# Patient Record
Sex: FEMALE | Race: WHITE
[De-identification: names, ages, dates, MRNs, and addresses within clinical notes are randomized per-mention and may not be internally consistent; named-entity substitution may affect disease eponyms.]

---

## 2016-05-30 VITALS — DIASTOLIC BLOOD PRESSURE: 85 MMHG | SYSTOLIC BLOOD PRESSURE: 193 MMHG

## 2017-05-02 ENCOUNTER — HOSPITAL ENCOUNTER (OUTPATIENT)
Dept: HOSPITAL 24 - LAB | Age: 77
End: 2017-05-02
Attending: NURSE PRACTITIONER
Payer: COMMERCIAL

## 2017-05-02 DIAGNOSIS — R42: ICD-10-CM

## 2017-05-02 DIAGNOSIS — I50.1: Primary | ICD-10-CM

## 2017-05-02 DIAGNOSIS — D63.8: ICD-10-CM

## 2017-05-02 LAB
ALBUMIN SERPL BCP-MCNC: 3.6 G/DL (ref 3.4–5)
ALP SERPL-CCNC: 106 UNITS/L (ref 46–116)
ALT SERPL W P-5'-P-CCNC: 13 UNITS/L (ref 12–78)
AST SERPL W P-5'-P-CCNC: 19 UNITS/L (ref 15–37)
BASOPHILS # BLD AUTO: 0.1 X10^3/UL (ref 0–0.1)
BASOPHILS NFR BLD AUTO: 1.3 % (ref 0.2–1)
BUN SERPL-MCNC: 17 MG/DL (ref 7–18)
CALCIUM ALBUM COR SERPL-SCNC: (no result) MG/DL (ref 8.5–10.1)
CALCIUM ALBUM COR SERPL-SCNC: (no result) MMOL/L (ref 136–145)
CALCIUM SERPL-MCNC: 9.3 MG/DL (ref 8.5–10.1)
CHLORIDE SERPL-SCNC: 107 MMOL/L (ref 98–107)
CO2 SERPL-SCNC: 34.1 MMOL/L (ref 21–32)
CREAT SERPL-MCNC: 1.89 MG/DL (ref 0.55–1.02)
EGFR  BLACK RACES: 33 (ref 60–?)
EOSINOPHIL # BLD AUTO: 0.1 X10^3/UL (ref 0–0.2)
EOSINOPHIL NFR BLD AUTO: 2 % (ref 0.9–2.9)
ERYTHROCYTE [DISTWIDTH] IN BLOOD BY AUTOMATED COUNT: 22.8 % (ref 11.6–16.5)
GLUCOSE BLD-SCNC: 90 MG/DL (ref 65–99)
HCT VFR BLD AUTO: 36.5 % (ref 36–47)
HGB BLD-MCNC: 11.6 G/DL (ref 12–16)
LYMPHOCYTES # BLD AUTO: 2.5 X10^3/UL (ref 1.3–2.9)
LYMPHOCYTES NFR BLD AUTO: 35 % (ref 21–51)
MCH RBC QN AUTO: 23.9 PG (ref 27–34)
MCHC RBC AUTO-ENTMCNC: 31.8 G/DL (ref 33–35)
MCV RBC AUTO: 75.3 FL (ref 80–100)
MONOCYTES # BLD AUTO: 0.6 X10^3/UL (ref 0.3–0.8)
MONOCYTES NFR BLD AUTO: 8.4 % (ref 0–13)
NEUTROPHILS # BLD AUTO: 3.9 X10^3/UL (ref 2.2–4.8)
NEUTROPHILS NFR BLD AUTO: 53.3 % (ref 42–75)
PLAT MORPH BLD: NORMAL
PLATELET # BLD: 243 X10^3/UL (ref 150–450)
PMV BLD AUTO: 10.2 FL (ref 7.4–11)
PROT SERPL-MCNC: 7.1 G/DL (ref 6.4–8.2)
RBC # BLD AUTO: 4.85 X10^6/UL (ref 3.5–5.4)
RBC MORPH BLD: (no result)
RBC MORPH BLD: SLIGHT
SODIUM SERPL-SCNC: 147 MMOL/L (ref 136–145)
WBC NRBC COR # BLD AUTO: 7.2 X10^3/UL (ref 3.6–10)

## 2017-05-02 PROCEDURE — 80053 COMPREHEN METABOLIC PANEL: CPT

## 2017-05-02 PROCEDURE — 83880 ASSAY OF NATRIURETIC PEPTIDE: CPT

## 2017-05-02 PROCEDURE — 85025 COMPLETE CBC W/AUTO DIFF WBC: CPT

## 2017-05-02 PROCEDURE — 36415 COLL VENOUS BLD VENIPUNCTURE: CPT

## 2017-05-03 LAB — BNP SERPL-MCNC: 494 PG/ML (ref 0–79)

## 2017-08-08 ENCOUNTER — HOSPITAL ENCOUNTER (INPATIENT)
Dept: HOSPITAL 24 - MED/SURG | Age: 77
LOS: 4 days | Discharge: HOME | DRG: 392 | End: 2017-08-12
Attending: INTERNAL MEDICINE | Admitting: INTERNAL MEDICINE
Payer: COMMERCIAL

## 2017-08-08 VITALS — BODY MASS INDEX: 28.6 KG/M2

## 2017-08-08 DIAGNOSIS — R11.2: ICD-10-CM

## 2017-08-08 DIAGNOSIS — E86.0: ICD-10-CM

## 2017-08-08 DIAGNOSIS — K44.9: ICD-10-CM

## 2017-08-08 DIAGNOSIS — K21.9: ICD-10-CM

## 2017-08-08 DIAGNOSIS — K20.8: ICD-10-CM

## 2017-08-08 DIAGNOSIS — R19.7: ICD-10-CM

## 2017-08-08 DIAGNOSIS — R53.1: ICD-10-CM

## 2017-08-08 DIAGNOSIS — I10: ICD-10-CM

## 2017-08-08 DIAGNOSIS — F41.8: ICD-10-CM

## 2017-08-08 DIAGNOSIS — K26.9: ICD-10-CM

## 2017-08-08 DIAGNOSIS — K25.9: ICD-10-CM

## 2017-08-08 DIAGNOSIS — R10.84: ICD-10-CM

## 2017-08-08 DIAGNOSIS — K52.89: Primary | ICD-10-CM

## 2017-08-08 DIAGNOSIS — E03.8: ICD-10-CM

## 2017-08-08 DIAGNOSIS — R06.09: ICD-10-CM

## 2017-08-08 DIAGNOSIS — K29.80: ICD-10-CM

## 2017-08-08 DIAGNOSIS — R93.5: ICD-10-CM

## 2017-08-08 DIAGNOSIS — I50.9: ICD-10-CM

## 2017-08-08 LAB
ALBUMIN SERPL BCP-MCNC: 3.1 G/DL (ref 3.4–5)
ALP SERPL-CCNC: 120 UNITS/L (ref 46–116)
ALT SERPL W P-5'-P-CCNC: 276 UNITS/L (ref 12–78)
AMYLASE SERPL-CCNC: 14 UNITS/L (ref 25–115)
AST SERPL W P-5'-P-CCNC: 334 UNITS/L (ref 15–37)
BASOPHILS # BLD AUTO: 0.1 X10^3/UL (ref 0–0.1)
BASOPHILS NFR BLD AUTO: 0.8 % (ref 0.2–1)
BUN SERPL-MCNC: 17 MG/DL (ref 7–18)
CALCIUM ALBUM COR SERPL-SCNC: 143 MMOL/L (ref 136–145)
CALCIUM ALBUM COR SERPL-SCNC: 8.9 MG/DL (ref 8.5–10.1)
CALCIUM SERPL-MCNC: 8.2 MG/DL (ref 8.5–10.1)
CHLORIDE SERPL-SCNC: 108 MMOL/L (ref 98–107)
CO2 SERPL-SCNC: 25.7 MMOL/L (ref 21–32)
CREAT SERPL-MCNC: 1.28 MG/DL (ref 0.55–1.02)
EGFR  BLACK RACES: 52 (ref 60–?)
EOSINOPHIL # BLD AUTO: 0 X10^3/UL (ref 0–0.2)
EOSINOPHIL NFR BLD AUTO: 0.1 % (ref 0.9–2.9)
ERYTHROCYTE [DISTWIDTH] IN BLOOD BY AUTOMATED COUNT: 18.6 % (ref 11.6–16.5)
GLUCOSE BLD-SCNC: 122 MG/DL (ref 65–99)
HCT VFR BLD AUTO: 32.4 % (ref 36–47)
HGB BLD-MCNC: 10 G/DL (ref 12–16)
LIPASE SERPL-CCNC: 50 UNITS/L (ref 73–393)
LYMPHOCYTES # BLD AUTO: 1.4 X10^3/UL (ref 1.3–2.9)
LYMPHOCYTES NFR BLD AUTO: 15.7 % (ref 21–51)
MCH RBC QN AUTO: 23 PG (ref 27–34)
MCHC RBC AUTO-ENTMCNC: 31 G/DL (ref 33–35)
MCV RBC AUTO: 74.3 FL (ref 80–100)
MONOCYTES # BLD AUTO: 0.7 X10^3/UL (ref 0.3–0.8)
MONOCYTES NFR BLD AUTO: 8.7 % (ref 0–13)
NEUTROPHILS # BLD AUTO: 6.5 X10^3/UL (ref 2.2–4.8)
NEUTROPHILS NFR BLD AUTO: 74.7 % (ref 42–75)
PLAT MORPH BLD: NORMAL
PLATELET # BLD: 235 X10^3/UL (ref 150–450)
PMV BLD AUTO: 9.6 FL (ref 7.4–11)
PROT SERPL-MCNC: 6.4 G/DL (ref 6.4–8.2)
RBC # BLD AUTO: 4.36 X10^6/UL (ref 3.5–5.4)
RBC MORPH BLD: (no result)
RBC MORPH BLD: (no result)
SODIUM SERPL-SCNC: 142 MMOL/L (ref 136–145)
WBC NRBC COR # BLD AUTO: 8.7 X10^3/UL (ref 3.6–10)

## 2017-08-08 PROCEDURE — 88342 IMHCHEM/IMCYTCHM 1ST ANTB: CPT

## 2017-08-08 PROCEDURE — 84132 ASSAY OF SERUM POTASSIUM: CPT

## 2017-08-08 PROCEDURE — 99100 ANES PT EXTEME AGE<1 YR&>70: CPT

## 2017-08-08 PROCEDURE — A4217 STERILE WATER/SALINE, 500 ML: HCPCS

## 2017-08-08 PROCEDURE — 87045 FECES CULTURE AEROBIC BACT: CPT

## 2017-08-08 PROCEDURE — 80053 COMPREHEN METABOLIC PANEL: CPT

## 2017-08-08 PROCEDURE — 88305 TISSUE EXAM BY PATHOLOGIST: CPT

## 2017-08-08 PROCEDURE — 82150 ASSAY OF AMYLASE: CPT

## 2017-08-08 PROCEDURE — 86677 HELICOBACTER PYLORI ANTIBODY: CPT

## 2017-08-08 PROCEDURE — 87427 SHIGA-LIKE TOXIN AG IA: CPT

## 2017-08-08 PROCEDURE — 87338 HPYLORI STOOL AG IA: CPT

## 2017-08-08 PROCEDURE — 87899 AGENT NOS ASSAY W/OPTIC: CPT

## 2017-08-08 PROCEDURE — 94760 N-INVAS EAR/PLS OXIMETRY 1: CPT

## 2017-08-08 PROCEDURE — 36415 COLL VENOUS BLD VENIPUNCTURE: CPT

## 2017-08-08 PROCEDURE — 87493 C DIFF AMPLIFIED PROBE: CPT

## 2017-08-08 PROCEDURE — S0030 INJECTION, METRONIDAZOLE: HCPCS

## 2017-08-08 PROCEDURE — 85025 COMPLETE CBC W/AUTO DIFF WBC: CPT

## 2017-08-08 PROCEDURE — 74177 CT ABD & PELVIS W/CONTRAST: CPT

## 2017-08-08 PROCEDURE — 71010: CPT

## 2017-08-08 PROCEDURE — S0106 BUPROPION HCL SR 60 TABLETS: HCPCS

## 2017-08-08 PROCEDURE — 93005 ELECTROCARDIOGRAM TRACING: CPT

## 2017-08-08 PROCEDURE — 87205 SMEAR GRAM STAIN: CPT

## 2017-08-08 PROCEDURE — 93010 ELECTROCARDIOGRAM REPORT: CPT

## 2017-08-08 PROCEDURE — 83690 ASSAY OF LIPASE: CPT

## 2017-08-08 PROCEDURE — 82270 OCCULT BLOOD FECES: CPT

## 2017-08-08 RX ADMIN — METRONIDAZOLE SCH MLS/HR: 5 INJECTION, SOLUTION INTRAVENOUS at 15:22

## 2017-08-08 RX ADMIN — METRONIDAZOLE SCH MLS/HR: 5 INJECTION, SOLUTION INTRAVENOUS at 21:52

## 2017-08-08 NOTE — RAD
AP Chest



Indication: Weakness



Comparison: 6/9/16



Findings:



The trachea is midline.  The cardiac silhouette is enlarged. Left chest wall AICD projects in expect
ed positioning. The lungs are clear without focal infiltrate or effusion.  The bony thorax is unrema
rkable.  



IMPRESSION: 

 

1.  Cardiomegaly without acute airspace disease or CHF.



Reported By:Electronically Signed by PARISH BRUSH MD at 8/8/2017 1:58:44 PM

## 2017-08-08 NOTE — DR.H&P
H&P





- History & Physical for Day of:


H&P Date: 08/08/17





- Chief Complaint


Chief Complaint: abdominal pain,  loose stool with mucous, pallor weakness and 

naik





- Allergies


Allergies/Adverse Reactions: 


 Allergies











Allergy/AdvReac Type Severity Reaction Status Date / Time


 


No Known Drug Allergies Allergy   Verified 08/08/17 14:14














- History of Present Illness


History of Present Illness: 77 WF DIRECT ADMIT FROM DR BERGERON OFFICE WITH CO 

PALLOR  AND ABDOMINAL PAIN AND NAIK. PT HAD LABS CBC CMP YESTERDAY. PT STARTED 

ON PO CIPRO FOR COLITIS. PT MUCH WEAKER TODAY. PLAN TO ADMIT FOR IV ATBX, CT 

ABD PELVIS, ADMISSION LABS, ANEMIA PANEL.  IV HYDRATIN AND REPEAT AM LABS





- Past Medical History


Past Medical History: Anemia, Angina, Anxiety, Arthritis, CHF, Dementia, 

Depression, GERD, Hypertension, Hyperthyroidism, Hypothyroidism


Additional Medical History: Hiatal Hernia, Urinary Tract Infections, Urinary 

Incontinence, Fibromyalgia





- Past Surgical History


Surgical History: Cholecystectomy, Hysterectomy, Ortho Surgery, Other


Additional Surgical History: ICD/Pacemaker, Cancer removed from Back





- Family History


Family Medical History: Cancer





- Social History


Does patient currently use any type of tobacco product: No


Have you used tobacco products in the last 12 months: No


Type of Tobacco Use: None


Does any household member use tobacco: No


Alcohol Use: None


Drug Use: Prescription Drugs





- Review of Systems


Constitutional: No Symptoms Reported


Eyes: No Symptoms Reported


ENT: No Symptoms Reported


Respiratory: SOB with Excertion


Cardiovascular: No Symptoms Reported


Gastrointestinal: Nausea, Vomiting, Abdominal Pain, Diarrhea


Genitourinary: No Symptoms Reported


Musculoskeletal: Back Pain, Leg Pain


Skin: No Symptoms Reported


Neurological: Weakness





- Physical Exam


Vital Signs: 


 





Temperature                      97.4 F


Pulse Rate [Left Brachial]       73


Respiratory Rate                 18


Blood Pressure [Right Arm]       182/76


Blood Pressure [Left Arm]        199/94


Blood Pressure                   193/85


O2 Sat by Pulse Oximetry         100








Oriented: Normal


Eyes: Normal


Ear: Normal


Nose: Normal


Throat: Dry


Respiratory: Clear Throughout


Cardiovascular: Normal


: Normal


Auscultation: Bowel Sounds: Normal


Palpation: Normal


Tenderness: RUQ, RLQ, Epigastric, Suprapubic


Skin: Decreased Turgur, Other (PALLOR)


Musculoskeletal: Back:Lumbar


Psychiatric: Anxiety


Affect: Anxious


Speech Pattern: Clear, Appropriate





- Assessment/Plan


(1) Abdominal pain


Qualifiers: 


   Abdominal location: A 


Status: Acute   


Plan: ADMIT, CT ABD PELVIS.  NPO, IV HYDRATION IV CIPRO.  ADMISSION LABS CBC 

CMP UA, AMYLASE LIPASE.  PAIN AND NAUSEA CONTROL.  STOOL STUDIES AND ANEMIA 

PANEL   





(2) Nausea vomiting and diarrhea


Status: Acute   





(3) CHF (congestive heart failure)


Qualifiers: 


   Congestive heart failure type: C   Congestive heart failure chronicity: C 


Status: Chronic   


Plan: CXR ON ADMISSION   





(4) GERD (gastroesophageal reflux disease)


Qualifiers: 


   Esophagitis presence: E 


Status: Chronic   


Plan: PPI THERAPY   





(5) Hypertension


Qualifiers: 


   Hypertension type: H 


Status: Chronic   





(6) Hypothyroidism


Qualifiers: 


   Hypothyroidism type: H 


Status: Chronic

## 2017-08-09 LAB
ALBUMIN SERPL BCP-MCNC: 2.8 G/DL (ref 3.4–5)
ALP SERPL-CCNC: 115 UNITS/L (ref 46–116)
ALT SERPL W P-5'-P-CCNC: 277 UNITS/L (ref 12–78)
AST SERPL W P-5'-P-CCNC: 322 UNITS/L (ref 15–37)
BASOPHILS # BLD AUTO: 0.1 X10^3/UL (ref 0–0.1)
BASOPHILS NFR BLD AUTO: 1 % (ref 0.2–1)
BUN SERPL-MCNC: 17 MG/DL (ref 7–18)
CALCIUM ALBUM COR SERPL-SCNC: (no result) MMOL/L (ref 136–145)
CALCIUM ALBUM COR SERPL-SCNC: 8.9 MG/DL (ref 8.5–10.1)
CALCIUM SERPL-MCNC: 7.9 MG/DL (ref 8.5–10.1)
CHLORIDE SERPL-SCNC: 108 MMOL/L (ref 98–107)
CO2 SERPL-SCNC: 24.1 MMOL/L (ref 21–32)
CREAT SERPL-MCNC: 1.28 MG/DL (ref 0.55–1.02)
EGFR  BLACK RACES: 52 (ref 60–?)
EOSINOPHIL # BLD AUTO: 0 X10^3/UL (ref 0–0.2)
EOSINOPHIL NFR BLD AUTO: 0.2 % (ref 0.9–2.9)
ERYTHROCYTE [DISTWIDTH] IN BLOOD BY AUTOMATED COUNT: 18.4 % (ref 11.6–16.5)
GLUCOSE BLD-SCNC: 110 MG/DL (ref 65–99)
HCT VFR BLD AUTO: 31 % (ref 36–47)
HGB BLD-MCNC: 9.7 G/DL (ref 12–16)
LYMPHOCYTES # BLD AUTO: 1.8 X10^3/UL (ref 1.3–2.9)
LYMPHOCYTES NFR BLD AUTO: 20.4 % (ref 21–51)
MCH RBC QN AUTO: 23.2 PG (ref 27–34)
MCHC RBC AUTO-ENTMCNC: 31.3 G/DL (ref 33–35)
MCV RBC AUTO: 74.1 FL (ref 80–100)
MONOCYTES # BLD AUTO: 1 X10^3/UL (ref 0.3–0.8)
MONOCYTES NFR BLD AUTO: 12 % (ref 0–13)
NEUTROPHILS # BLD AUTO: 5.7 X10^3/UL (ref 2.2–4.8)
NEUTROPHILS NFR BLD AUTO: 66.4 % (ref 42–75)
PLAT MORPH BLD: NORMAL
PLATELET # BLD: 211 X10^3/UL (ref 150–450)
PMV BLD AUTO: 10.4 FL (ref 7.4–11)
PROT SERPL-MCNC: 5.9 G/DL (ref 6.4–8.2)
RBC # BLD AUTO: 4.19 X10^6/UL (ref 3.5–5.4)
RBC MORPH BLD: (no result)
SODIUM SERPL-SCNC: 144 MMOL/L (ref 136–145)
WBC NRBC COR # BLD AUTO: 8.6 X10^3/UL (ref 3.6–10)

## 2017-08-09 RX ADMIN — METRONIDAZOLE SCH MLS/HR: 5 INJECTION, SOLUTION INTRAVENOUS at 15:34

## 2017-08-09 RX ADMIN — CIPROFLOXACIN SCH MLS/HR: 2 INJECTION, SOLUTION INTRAVENOUS at 21:08

## 2017-08-09 RX ADMIN — METRONIDAZOLE SCH MLS/HR: 5 INJECTION, SOLUTION INTRAVENOUS at 21:08

## 2017-08-09 RX ADMIN — LISINOPRIL SCH MG: 20 TABLET ORAL at 15:00

## 2017-08-09 RX ADMIN — METRONIDAZOLE SCH MLS/HR: 5 INJECTION, SOLUTION INTRAVENOUS at 09:37

## 2017-08-09 RX ADMIN — METRONIDAZOLE SCH MLS/HR: 5 INJECTION, SOLUTION INTRAVENOUS at 04:30

## 2017-08-09 RX ADMIN — CLONAZEPAM SCH MG: 0.5 TABLET ORAL at 21:09

## 2017-08-09 RX ADMIN — LISINOPRIL SCH MG: 20 TABLET ORAL at 21:10

## 2017-08-09 NOTE — PCM.PROG
Progress Note





- Progress Note for Day of


Date: 08/09/17





- Subjective


Subjective: abdominal pain, nausea improving.  pt npo this am for ct scan abd/

pelvis





- Past Medical Family Social History


Past Med/Fam/Surg Hx: No changes since H&P


Allergies: 


Allergies





No Known Drug Allergies Allergy (Verified 08/08/17 14:14)


 











- Review of Systems


ROS: No change since H&P





- Vital Signs and I&O's


Vital Signs: 


 





Temperature                      97.4 F


Pulse Rate [Left Brachial]       73


Respiratory Rate                 20


Blood Pressure [Right Arm]       195/84


Blood Pressure [Left Arm]        173/82


Blood Pressure                   193/85


O2 Sat by Pulse Oximetry         97








Intake and Output: 


 Intake & Output











 08/07/17 08/08/17 08/09/17 08/10/17





 11:59 11:59 11:59 11:59


 


Intake Total   340 590


 


Balance   340 590














- Physical Exam


Oriented: Normal


Eyes: Normal


Ear: Normal


Nose: Normal


Throat: Dry


Respiratory: Diminished


Cardiovascular: Normal


: Normal


Auscultation: Bowel Sounds: Normal


Tenderness: RUQ, RLQ, Epigastric, Suprapubic


Skin: Decreased Turgur, Other (PALLOR)


Musculoskeletal: Back:Lumbar


Psychiatric: Anxiety


Affect: Anxious


Speech Pattern: Clear, Appropriate





- Laboratory and Diagnostics


Result Diagrams: 


 08/09/17 03:05





 08/09/17 03:05


Labs: 


 Laboratory











WBC  8.6 X10^3/uL (3.6-10.0)   08/09/17  03:05    


 


RBC  4.19 X10^6/uL (3.5-5.4)   08/09/17  03:05    


 


Hgb  9.7 g/dL (12.0-16.0)  L  08/09/17  03:05    


 


Hct  31.0 % (36.0-47.0)  L  08/09/17  03:05    


 


MCV  74.1 fL (80.0-100.0)  L  08/09/17  03:05    


 


MCH  23.2 pg (27.0-34.0)  L  08/09/17  03:05    


 


MCHC  31.3 g/dL (33.0-35.0)  L  08/09/17  03:05    


 


RDW  18.4 % (11.6-16.5)  H  08/09/17  03:05    


 


Plt Count  211 X10^3/uL (150.0-450.0)   08/09/17  03:05    


 


Plt Count Comment  Adequate  (ADEQUATE)   08/09/17  03:05    


 


MPV  10.4 fL (7.4-11.0)   08/09/17  03:05    


 


Neut %  66.4 % (42.0-75.0)   08/09/17  03:05    


 


Lymph %  20.4 % (21.0-51.0)  L  08/09/17  03:05    


 


Mono %  12.0 % (0.0-13.0)   08/09/17  03:05    


 


Eos %  0.2 % (0.9-2.9)  L  08/09/17  03:05    


 


Baso %  1.0 % (0.2-1.0)   08/09/17  03:05    


 


Neut #  5.7 x10^3/uL (2.2-4.8)  H  08/09/17  03:05    


 


Lymph #  1.8 X10^3/uL (1.3-2.9)   08/09/17  03:05    


 


Mono #  1.0 x10^3/uL (0.3-0.8)  H  08/09/17  03:05    


 


Eos #  0.0 x10^3/uL (0.0-0.2)   08/09/17  03:05    


 


Baso #  0.1 X10^3/uL (0.0-0.1)   08/09/17  03:05    


 


Absolute Nucleated RBC  1.1 /100WBC  08/09/17  03:05    


 


Plt Morphology Comment  Normal  (NORMAL)   08/09/17  03:05    


 


RBC Morphology  Abnormal  (NORMAL)  A  08/09/17  03:05    


 


Hypochromasia  1+  A  08/09/17  03:05    


 


Anisocytosis  1+  A  08/09/17  03:05    


 


Microcytosis  1+  A  08/09/17  03:05    


 


Sodium  144 mmol/L (136-145)   08/09/17  03:05    


 


Corrected Sodium  TNP   08/09/17  03:05    


 


Potassium  3.7 mmol/L (3.5-5.1)   08/09/17  03:05    


 


Chloride  108 mmol/L ()  H  08/09/17  03:05    


 


Carbon Dioxide  24.1 mmol/L (21-32)   08/09/17  03:05    


 


BUN  17 mg/dL (7-18)   08/09/17  03:05    


 


Creatinine  1.28 mg/dL (0.55-1.02)  H  08/09/17  03:05    


 


Est GFR (MDRD) Af Amer  52  (>60)  L  08/09/17  03:05    


 


Est GFR (MDRD) Non-Af  43  (>60)  L  08/09/17  03:05    


 


Glucose  110 mg/dL (65-99)  H  08/09/17  03:05    


 


Calcium  7.9 mg/dL (8.5-10.1)  L  08/09/17  03:05    


 


Corrected Calcium  8.9 mg/dL (8.5-10.1)   08/09/17  03:05    


 


Total Bilirubin  2.30 mg/dL (0.2-1.0)  H  08/09/17  03:05    


 


AST  322 Units/L (15-37)  H  08/09/17  03:05    


 


ALT  277 Units/L (12-78)  H  08/09/17  03:05    


 


Alkaline Phosphatase  115 Units/L ()   08/09/17  03:05    


 


Total Protein  5.9 g/dL (6.4-8.2)  L  08/09/17  03:05    


 


Albumin  2.8 g/dL (3.4-5.0)  L  08/09/17  03:05    


 


Globulin  3.1 g/dL (2.5-4.5)   08/09/17  03:05    


 


Albumin/Globulin Ratio  0.9 Ratio (1.1-2.1)  L  08/09/17  03:05    


 


Amylase  14 Units/L ()  L  08/08/17  13:35    


 


Lipase  50 Units/L ()  L  08/08/17  13:35    














- Plan


(1) Abdominal pain


Status: Acute   


Qualifiers: 


   Abdominal location: A 


Plan: CT ABD PELVIS THIS AM.  IV HYDRATION IV CIPRO, FLAGYL.  REPEAT AM LABS.  

PAIN AND NAUSEA CONTROL.  STOOL STUDIES PENDING   





(2) Nausea vomiting and diarrhea


Status: Acute   


Plan: STABLE   





(3) CHF (congestive heart failure)


Status: Chronic   


Qualifiers: 


   Congestive heart failure type: C   Congestive heart failure chronicity: C 


Plan: CXR ON ADMISSION, LASIX IV STRICT I & OS   





(4) GERD (gastroesophageal reflux disease)


Status: Chronic   


Qualifiers: 


   Esophagitis presence: E 


Plan: PPI THERAPY   





(5) Hypertension


Status: Chronic   


Qualifiers: 


   Hypertension type: H 





(6) Hypothyroidism


Status: Chronic   


Qualifiers: 


   Hypothyroidism type: H

## 2017-08-09 NOTE — CT
CT abdomen and pelvis with contrast



Indication: Anemia, abdominal pain, mucous in stool



Technique: Helical CT images of the abdomen and pelvis were obtained with IV contrast. Reformatted i
mages in the coronal and sagittal planes were also generated for review.



Comparison: June 26, 2014



Findings: There are partially visualized small bilateral pleural effusions, larger on the left. The 
heart is mildly enlarged with a small pericardial effusion. Degenerative changes are noted of the nigel
mbar spine. No aggressive osseous lesions are identified.



The gallbladder is surgically absent. The liver, spleen, pancreas, adrenals and kidneys are unremark
able. 



There is a moderate-sized sliding hiatal hernia with small fluid noted about the distal esophagus, l
ikely related to cranial extension of abdominal ascites. There is moderate thickening and mucosal en
hancement of the entire duodenum to the level of the ligament of Treitz. There is mild colonic diver
ticulosis without evidence of acute inflammation. The remaining GI tract, including the appendix is 
normal. There is moderate calcification of the abdominal aorta without aneurysm. The urinary bladder
 is collapsed but normal. The uterus is surgically absent. There is small volume abdominal pelvic as
cites. No free air or lymphadenopathy is identified. There is moderate generalized body wall edema.



Impression: 

1. Moderate duodenal wall thickening and mucosal enhancement, suggestive for duodenitis.

2. Findings suggestive for fluid overload, including bilateral pleural effusions, small pericardial 
effusion, abdominal pelvic ascites and generalized anasarca.

3. Moderate sized sliding hiatal hernia, colonic diverticulosis and additional incidental findings, 
as above.







Reported By:Electronically Signed by JILLIAN VARGAS MD at 8/9/2017 2:08:41 PM

## 2017-08-10 LAB
ALBUMIN SERPL BCP-MCNC: 2.6 G/DL (ref 3.4–5)
ALP SERPL-CCNC: 101 UNITS/L (ref 46–116)
ALT SERPL W P-5'-P-CCNC: 299 UNITS/L (ref 12–78)
AST SERPL W P-5'-P-CCNC: 343 UNITS/L (ref 15–37)
BASOPHILS # BLD AUTO: 0.1 X10^3/UL (ref 0–0.1)
BASOPHILS NFR BLD AUTO: 1 % (ref 0.2–1)
BUN SERPL-MCNC: 17 MG/DL (ref 7–18)
CALCIUM ALBUM COR SERPL-SCNC: (no result) MMOL/L (ref 136–145)
CALCIUM ALBUM COR SERPL-SCNC: 8.6 MG/DL (ref 8.5–10.1)
CALCIUM SERPL-MCNC: 7.5 MG/DL (ref 8.5–10.1)
CHLORIDE SERPL-SCNC: 108 MMOL/L (ref 98–107)
CO2 SERPL-SCNC: 28.3 MMOL/L (ref 21–32)
CREAT SERPL-MCNC: 1.26 MG/DL (ref 0.55–1.02)
EGFR  BLACK RACES: 53 (ref 60–?)
EOSINOPHIL # BLD AUTO: 0 X10^3/UL (ref 0–0.2)
EOSINOPHIL NFR BLD AUTO: 0.3 % (ref 0.9–2.9)
ERYTHROCYTE [DISTWIDTH] IN BLOOD BY AUTOMATED COUNT: 18.6 % (ref 11.6–16.5)
GLUCOSE BLD-SCNC: 93 MG/DL (ref 65–99)
HCT VFR BLD AUTO: 27.7 % (ref 36–47)
HGB BLD-MCNC: 8.8 G/DL (ref 12–16)
LYMPHOCYTES # BLD AUTO: 1.7 X10^3/UL (ref 1.3–2.9)
LYMPHOCYTES NFR BLD AUTO: 22.3 % (ref 21–51)
MCH RBC QN AUTO: 23.4 PG (ref 27–34)
MCHC RBC AUTO-ENTMCNC: 31.9 G/DL (ref 33–35)
MCV RBC AUTO: 73.5 FL (ref 80–100)
MONOCYTES # BLD AUTO: 0.9 X10^3/UL (ref 0.3–0.8)
MONOCYTES NFR BLD AUTO: 12 % (ref 0–13)
NEUTROPHILS # BLD AUTO: 5 X10^3/UL (ref 2.2–4.8)
NEUTROPHILS NFR BLD AUTO: 64.4 % (ref 42–75)
PLAT MORPH BLD: NORMAL
PLATELET # BLD: 180 X10^3/UL (ref 150–450)
PMV BLD AUTO: 10.1 FL (ref 7.4–11)
PROT SERPL-MCNC: 5.4 G/DL (ref 6.4–8.2)
RBC # BLD AUTO: 3.77 X10^6/UL (ref 3.5–5.4)
RBC MORPH BLD: (no result)
RBC MORPH BLD: (no result)
RBC MORPH BLD: SLIGHT
SODIUM SERPL-SCNC: 144 MMOL/L (ref 136–145)
WBC NRBC COR # BLD AUTO: 7.8 X10^3/UL (ref 3.6–10)

## 2017-08-10 PROCEDURE — 0DB68ZX EXCISION OF STOMACH, VIA NATURAL OR ARTIFICIAL OPENING ENDOSCOPIC, DIAGNOSTIC: ICD-10-PCS | Performed by: INTERNAL MEDICINE

## 2017-08-10 RX ADMIN — LISINOPRIL SCH MG: 20 TABLET ORAL at 06:34

## 2017-08-10 RX ADMIN — METRONIDAZOLE SCH MLS/HR: 5 INJECTION, SOLUTION INTRAVENOUS at 03:34

## 2017-08-10 RX ADMIN — CIPROFLOXACIN SCH MLS/HR: 2 INJECTION, SOLUTION INTRAVENOUS at 21:23

## 2017-08-10 RX ADMIN — METRONIDAZOLE SCH MLS/HR: 5 INJECTION, SOLUTION INTRAVENOUS at 21:24

## 2017-08-10 RX ADMIN — GABAPENTIN SCH: 100 CAPSULE ORAL at 09:52

## 2017-08-10 RX ADMIN — LISINOPRIL SCH: 20 TABLET ORAL at 15:45

## 2017-08-10 RX ADMIN — METRONIDAZOLE SCH MLS/HR: 5 INJECTION, SOLUTION INTRAVENOUS at 15:58

## 2017-08-10 RX ADMIN — BUPROPION HYDROCHLORIDE SCH MG: 150 TABLET, FILM COATED, EXTENDED RELEASE ORAL at 09:51

## 2017-08-10 RX ADMIN — CIPROFLOXACIN SCH MLS/HR: 2 INJECTION, SOLUTION INTRAVENOUS at 09:51

## 2017-08-10 RX ADMIN — LISINOPRIL SCH MG: 20 TABLET ORAL at 21:24

## 2017-08-10 RX ADMIN — CLONAZEPAM SCH MG: 0.5 TABLET ORAL at 21:24

## 2017-08-10 RX ADMIN — METRONIDAZOLE SCH MLS/HR: 5 INJECTION, SOLUTION INTRAVENOUS at 09:51

## 2017-08-10 NOTE — PCM.PROG
Progress Note





- Progress Note for Day of


Date: 08/10/17





- Subjective


Subjective: 77 wf admitted two days ago with abdominal pain and co n/v/d. pt 

has ct abdpelvis revealing duedenitis, pt currently on flagyl and cipro iv. pt 

co continued upper, RUQ and mid abdominal tendnerness. pt attempted clear 

liquids last pm with co pain. Pt npo this am for EGD, elevated liver enzymes 

slightly improving. pt states she had gallbladder removed years ago. Plan to 

repeat am labs, EGD today





- Past Medical Family Social History


Past Med/Fam/Surg Hx: No changes since H&P


Allergies: 


Allergies





No Known Drug Allergies Allergy (Verified 08/08/17 14:14)


 











- Review of Systems


ROS: No change since H&P





- Vital Signs and I&O's


Vital Signs: 


 





Temperature                      97.9 F


Pulse Rate [Left Brachial]       64


Respiratory Rate                 20


Blood Pressure [Right Arm]       181/83


Blood Pressure [Left Arm]        190/83


Blood Pressure                   193/85


O2 Sat by Pulse Oximetry         96








Intake and Output: 


 Intake & Output











 08/08/17 08/09/17 08/10/17 08/11/17





 11:59 11:59 11:59 11:59


 


Intake Total  340 1302 


 


Balance  340 1302 














- Physical Exam


Oriented: Normal


Eyes: Normal


Ear: Normal


Nose: Normal


Throat: Dry


Respiratory: Diminished


Cardiovascular: Normal


: Normal


Auscultation: Bowel Sounds: Normal


Tenderness: RUQ, RLQ, Epigastric, Suprapubic


Skin: Decreased Turgur, Other (PALLOR)


Musculoskeletal: Back:Lumbar


Psychiatric: Anxiety


Affect: Anxious


Speech Pattern: Clear, Appropriate





- Laboratory and Diagnostics


Result Diagrams: 


 08/10/17 03:25





 08/10/17 03:25


Labs: 


 Laboratory











WBC  7.8 X10^3/uL (3.6-10.0)   08/10/17  03:25    


 


RBC  3.77 X10^6/uL (3.5-5.4)   08/10/17  03:25    


 


Hgb  8.8 g/dL (12.0-16.0)  L  08/10/17  03:25    


 


Hct  27.7 % (36.0-47.0)  L  08/10/17  03:25    


 


MCV  73.5 fL (80.0-100.0)  L  08/10/17  03:25    


 


MCH  23.4 pg (27.0-34.0)  L  08/10/17  03:25    


 


MCHC  31.9 g/dL (33.0-35.0)  L  08/10/17  03:25    


 


RDW  18.6 % (11.6-16.5)  H  08/10/17  03:25    


 


Plt Count  180 X10^3/uL (150.0-450.0)   08/10/17  03:25    


 


Plt Count Comment  Adequate  (ADEQUATE)   08/10/17  03:25    


 


MPV  10.1 fL (7.4-11.0)   08/10/17  03:25    


 


Neut %  64.4 % (42.0-75.0)   08/10/17  03:25    


 


Lymph %  22.3 % (21.0-51.0)   08/10/17  03:25    


 


Mono %  12.0 % (0.0-13.0)   08/10/17  03:25    


 


Eos %  0.3 % (0.9-2.9)  L  08/10/17  03:25    


 


Baso %  1.0 % (0.2-1.0)   08/10/17  03:25    


 


Neut #  5.0 x10^3/uL (2.2-4.8)  H  08/10/17  03:25    


 


Lymph #  1.7 X10^3/uL (1.3-2.9)   08/10/17  03:25    


 


Mono #  0.9 x10^3/uL (0.3-0.8)  H  08/10/17  03:25    


 


Eos #  0.0 x10^3/uL (0.0-0.2)   08/10/17  03:25    


 


Baso #  0.1 X10^3/uL (0.0-0.1)   08/10/17  03:25    


 


Absolute Nucleated RBC  1.1 /100WBC  08/10/17  03:25    


 


Plt Morphology Comment  Normal  (NORMAL)   08/10/17  03:25    


 


RBC Morphology  Abnormal  (NORMAL)  A  08/10/17  03:25    


 


Hypochromasia  1+  A  08/09/17  03:05    


 


Anisocytosis  1+  A  08/10/17  03:25    


 


Microcytosis  1+  A  08/09/17  03:05    


 


Crenated Cell  Slight  A  08/10/17  03:25    


 


Sodium  144 mmol/L (136-145)   08/10/17  03:25    


 


Corrected Sodium  TNP   08/10/17  03:25    


 


Potassium  3.2 mmol/L (3.5-5.1)  L  08/10/17  03:25    


 


Chloride  108 mmol/L ()  H  08/10/17  03:25    


 


Carbon Dioxide  28.3 mmol/L (21-32)   08/10/17  03:25    


 


BUN  17 mg/dL (7-18)   08/10/17  03:25    


 


Creatinine  1.26 mg/dL (0.55-1.02)  H  08/10/17  03:25    


 


Est GFR (MDRD) Af Amer  53  (>60)  L  08/10/17  03:25    


 


Est GFR (MDRD) Non-Af  44  (>60)  L  08/10/17  03:25    


 


Glucose  93 mg/dL (65-99)   08/10/17  03:25    


 


Calcium  7.5 mg/dL (8.5-10.1)  L  08/10/17  03:25    


 


Corrected Calcium  8.6 mg/dL (8.5-10.1)   08/10/17  03:25    


 


Total Bilirubin  1.60 mg/dL (0.2-1.0)  H  08/10/17  03:25    


 


AST  343 Units/L (15-37)  H  08/10/17  03:25    


 


ALT  299 Units/L (12-78)  H  08/10/17  03:25    


 


Alkaline Phosphatase  101 Units/L ()   08/10/17  03:25    


 


Total Protein  5.4 g/dL (6.4-8.2)  L  08/10/17  03:25    


 


Albumin  2.6 g/dL (3.4-5.0)  L  08/10/17  03:25    


 


Globulin  2.8 g/dL (2.5-4.5)   08/10/17  03:25    


 


Albumin/Globulin Ratio  0.9 Ratio (1.1-2.1)  L  08/10/17  03:25    


 


Amylase  14 Units/L ()  L  08/08/17  13:35    


 


Lipase  50 Units/L ()  L  08/08/17  13:35    


 


H. pylori IgG Antibody  Negative  (NEGATIVE)   08/10/17  03:25    














- Plan


(1) Duodenitis


Status: Acute   


Plan: IV CIPRO AND FLAGYL, CONTINUE PAIN AND NAUSEA CONTROL.  NPO THIS AM FOR 

EGD, WILL REPEAT AM LABS   





(2) Nausea vomiting and diarrhea


Status: Acute   


Plan: STABLE   





(3) CHF (congestive heart failure)


Status: Chronic   


Qualifiers: 


   Congestive heart failure type: C   Congestive heart failure chronicity: C 


Plan: CXR ON ADMISSION, LASIX IV STRICT I & OS   





(4) GERD (gastroesophageal reflux disease)


Status: Chronic   


Qualifiers: 


   Esophagitis presence: E 


Plan: PPI THERAPY   





(5) Hypertension


Status: Chronic   


Qualifiers: 


   Hypertension type: H 





(6) Hypothyroidism


Status: Chronic   


Qualifiers: 


   Hypothyroidism type: H

## 2017-08-10 NOTE — RAD
HISTORY:  Follow up congestive heart failure



Study:  Chest one view



Comparison: August 8, 2017







Findings:



Patient is rotated to the left. There is a pacemaker present on the left. The heart is enlarged. No 
congestive heart failure is noted. Lungs are free of acute alveolar infiltrates. No definite pleural
 effusions are identified. Increased density in the retrocardiac area left lower lobe may be partial
ly due to rotation however underlying atelectasis or effusion cannot be excluded.



IMPRESSION:

 

Cardiomegaly without definite congestive heart failure



Increased density retrocardiac area left lower lobe which could be on the basis of rotation however 
underlying atelectasis or effusion cannot be excluded.





Reported By:Electronically Signed by MARY NORMAN MD at 8/10/2017 6:37:02 AM

## 2017-08-11 LAB
ALBUMIN SERPL BCP-MCNC: 2.5 G/DL (ref 3.4–5)
ALP SERPL-CCNC: 100 UNITS/L (ref 46–116)
ALT SERPL W P-5'-P-CCNC: 257 UNITS/L (ref 12–78)
AST SERPL W P-5'-P-CCNC: 240 UNITS/L (ref 15–37)
BASOPHILS # BLD AUTO: 0.1 X10^3/UL (ref 0–0.1)
BASOPHILS NFR BLD AUTO: 0.9 % (ref 0.2–1)
BUN SERPL-MCNC: 14 MG/DL (ref 7–18)
CALCIUM ALBUM COR SERPL-SCNC: 142 MMOL/L (ref 136–145)
CALCIUM ALBUM COR SERPL-SCNC: 8.3 MG/DL (ref 8.5–10.1)
CALCIUM SERPL-MCNC: 7.1 MG/DL (ref 8.5–10.1)
CHLORIDE SERPL-SCNC: 107 MMOL/L (ref 98–107)
CO2 SERPL-SCNC: 27.3 MMOL/L (ref 21–32)
CREAT SERPL-MCNC: 1.16 MG/DL (ref 0.55–1.02)
EGFR  BLACK RACES: 58 (ref 60–?)
EOSINOPHIL # BLD AUTO: 0.1 X10^3/UL (ref 0–0.2)
EOSINOPHIL NFR BLD AUTO: 0.8 % (ref 0.9–2.9)
ERYTHROCYTE [DISTWIDTH] IN BLOOD BY AUTOMATED COUNT: 18.7 % (ref 11.6–16.5)
GLUCOSE BLD-SCNC: 115 MG/DL (ref 65–99)
HCT VFR BLD AUTO: 28.2 % (ref 36–47)
HGB BLD-MCNC: 9.2 G/DL (ref 12–16)
LYMPHOCYTES # BLD AUTO: 1.1 X10^3/UL (ref 1.3–2.9)
LYMPHOCYTES NFR BLD AUTO: 12.3 % (ref 21–51)
MCH RBC QN AUTO: 23.7 PG (ref 27–34)
MCHC RBC AUTO-ENTMCNC: 32.4 G/DL (ref 33–35)
MCV RBC AUTO: 73 FL (ref 80–100)
MONOCYTES # BLD AUTO: 1 X10^3/UL (ref 0.3–0.8)
MONOCYTES NFR BLD AUTO: 12 % (ref 0–13)
NEUTROPHILS # BLD AUTO: 6.4 X10^3/UL (ref 2.2–4.8)
NEUTROPHILS NFR BLD AUTO: 74 % (ref 42–75)
PLAT MORPH BLD: NORMAL
PLATELET # BLD: 167 X10^3/UL (ref 150–450)
PMV BLD AUTO: 10.1 FL (ref 7.4–11)
PROT SERPL-MCNC: 5.3 G/DL (ref 6.4–8.2)
RBC # BLD AUTO: 3.87 X10^6/UL (ref 3.5–5.4)
RBC MORPH BLD: (no result)
RBC MORPH BLD: (no result)
RBC MORPH BLD: SLIGHT
RBC MORPH BLD: SLIGHT
SODIUM SERPL-SCNC: 142 MMOL/L (ref 136–145)
WBC NRBC COR # BLD AUTO: 8.6 X10^3/UL (ref 3.6–10)

## 2017-08-11 RX ADMIN — METRONIDAZOLE SCH MLS/HR: 5 INJECTION, SOLUTION INTRAVENOUS at 21:20

## 2017-08-11 RX ADMIN — METRONIDAZOLE SCH MLS/HR: 5 INJECTION, SOLUTION INTRAVENOUS at 08:59

## 2017-08-11 RX ADMIN — LISINOPRIL SCH MG: 20 TABLET ORAL at 15:08

## 2017-08-11 RX ADMIN — CIPROFLOXACIN SCH MLS/HR: 2 INJECTION, SOLUTION INTRAVENOUS at 08:59

## 2017-08-11 RX ADMIN — POTASSIUM CHLORIDE SCH MEQ: 10 CAPSULE, COATED, EXTENDED RELEASE ORAL at 17:19

## 2017-08-11 RX ADMIN — LISINOPRIL SCH MG: 20 TABLET ORAL at 05:21

## 2017-08-11 RX ADMIN — METRONIDAZOLE SCH MLS/HR: 5 INJECTION, SOLUTION INTRAVENOUS at 02:46

## 2017-08-11 RX ADMIN — POTASSIUM CHLORIDE SCH MEQ: 10 CAPSULE, COATED, EXTENDED RELEASE ORAL at 21:19

## 2017-08-11 RX ADMIN — CIPROFLOXACIN SCH MLS/HR: 2 INJECTION, SOLUTION INTRAVENOUS at 21:20

## 2017-08-11 RX ADMIN — CLONAZEPAM SCH MG: 0.5 TABLET ORAL at 21:19

## 2017-08-11 RX ADMIN — METRONIDAZOLE SCH MLS/HR: 5 INJECTION, SOLUTION INTRAVENOUS at 15:08

## 2017-08-11 RX ADMIN — GABAPENTIN SCH: 100 CAPSULE ORAL at 09:00

## 2017-08-11 RX ADMIN — BUPROPION HYDROCHLORIDE SCH MG: 150 TABLET, FILM COATED, EXTENDED RELEASE ORAL at 09:00

## 2017-08-11 RX ADMIN — CARVEDILOL SCH MG: 25 TABLET, FILM COATED ORAL at 21:19

## 2017-08-11 RX ADMIN — PANTOPRAZOLE SODIUM SCH MG: 40 TABLET, DELAYED RELEASE ORAL at 09:05

## 2017-08-11 NOTE — RAD
HISTORY:  Congestive heart failure, abdominal pain



Study:  Chest one view



Comparison: August 10, 2017







Findings:



There is a pacemaker present on the left. The heart remains enlarged. No congestive heart failure is
 noted. The right lung and left upper lung fields are clear. Increased density is present in the ret
rocardiac area left lower lobe partially due to left pleural effusion. Underlying atelectasis and or
 infiltrate cannot be excluded. The bony thorax is unremarkable.



IMPRESSION:

 

Cardiomegaly without congestive heart failure



Left pleural effusion.



Increased density retrocardiac area of the left lower lobe partially due to left pleural effusion al
though some underlying infiltrate or atelectasis could not be excluded.





Reported By:Electronically Signed by MARY NORMAN MD at 8/11/2017 6:17:00 AM

## 2017-08-11 NOTE — PCM.PROG
Progress Note





- Progress Note for Day of


Date: 08/11/17





- Subjective


Subjective: 77 wf admitted 8/8/2017 abdominal pain and co n/v/d. pt has ct 

abdpelvis revealing duedenitis, pt currently on flagyl and cipro iv. pt co 

continued upper, RUQ and mid abdominal tendnerness. Pt had EGD on on 8/10 per 

Dr. Bejarano, started on protonix po for treatment of gastritis. Pt has improving 

LFT's and improved nausea, will advance diet and discussed possible d/c on 

saturday with po antibiotics with condition stable.  **please note pt is 

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 

herself and spouse at home.





- Past Medical Family Social History


Past Med/Fam/Surg Hx: No changes since H&P


Allergies: 


Allergies





No Known Drug Allergies Allergy (Verified 08/08/17 14:14)


 











- Review of Systems


ROS: No change since H&P





- Vital Signs and I&O's


Vital Signs: 


 





Temperature                      97.6 F


Pulse Rate [Left Brachial]       68


Pulse Rate                       98


Respiratory Rate                 20


Blood Pressure [Right Arm]       149/72


Blood Pressure [Left Arm]        141/69


Blood Pressure                   193/85


O2 Sat by Pulse Oximetry         68








Intake and Output: 


 Intake & Output











 08/09/17 08/10/17 08/11/17 08/12/17





 11:59 11:59 11:59 11:59


 


Intake Total 340 1302 480 


 


Balance 340 1302 480 














- Physical Exam


Oriented: Normal


Eyes: Normal


Ear: Normal


Nose: Normal


Throat: Dry


Respiratory: Diminished


Cardiovascular: Normal


: Normal


Auscultation: Bowel Sounds: Normal


Tenderness: RUQ, RLQ, Epigastric, Suprapubic


Skin: Decreased Turgur, Other (PALLOR)


Musculoskeletal: Back:Lumbar


Psychiatric: Anxiety


Affect: Anxious


Speech Pattern: Clear, Appropriate





- Laboratory and Diagnostics


Result Diagrams: 


 08/11/17 03:25





 08/11/17 08:28


Labs: 


 





08/11/17 08:17   Stool    - Final





 Laboratory











WBC  8.6 X10^3/uL (3.6-10.0)   08/11/17  03:25    


 


RBC  3.87 X10^6/uL (3.5-5.4)   08/11/17  03:25    


 


Hgb  9.2 g/dL (12.0-16.0)  L  08/11/17  03:25    


 


Hct  28.2 % (36.0-47.0)  L  08/11/17  03:25    


 


MCV  73.0 fL (80.0-100.0)  L  08/11/17  03:25    


 


MCH  23.7 pg (27.0-34.0)  L  08/11/17  03:25    


 


MCHC  32.4 g/dL (33.0-35.0)  L  08/11/17  03:25    


 


RDW  18.7 % (11.6-16.5)  H  08/11/17  03:25    


 


Plt Count  167 X10^3/uL (150.0-450.0)   08/11/17  03:25    


 


Plt Count Comment  Adequate  (ADEQUATE)   08/11/17  03:25    


 


MPV  10.1 fL (7.4-11.0)   08/11/17  03:25    


 


Neut %  74.0 % (42.0-75.0)   08/11/17  03:25    


 


Lymph %  12.3 % (21.0-51.0)  L  08/11/17  03:25    


 


Mono %  12.0 % (0.0-13.0)   08/11/17  03:25    


 


Eos %  0.8 % (0.9-2.9)  L  08/11/17  03:25    


 


Baso %  0.9 % (0.2-1.0)   08/11/17  03:25    


 


Neut #  6.4 x10^3/uL (2.2-4.8)  H  08/11/17  03:25    


 


Lymph #  1.1 X10^3/uL (1.3-2.9)  L  08/11/17  03:25    


 


Mono #  1.0 x10^3/uL (0.3-0.8)  H  08/11/17  03:25    


 


Eos #  0.1 x10^3/uL (0.0-0.2)   08/11/17  03:25    


 


Baso #  0.1 X10^3/uL (0.0-0.1)   08/11/17  03:25    


 


Absolute Nucleated RBC  0.4 /100WBC  08/11/17  03:25    


 


Plt Morphology Comment  Normal  (NORMAL)   08/11/17  03:25    


 


RBC Morphology  Abnormal  (NORMAL)  A  08/11/17  03:25    


 


Hypochromasia  1+  A  08/11/17  03:25    


 


Anisocytosis  Slight  A  08/11/17  03:25    


 


Microcytosis  Slight  A  08/11/17  03:25    


 


Crenated Cell  Slight  A  08/10/17  03:25    


 


Sodium  142 mmol/L (136-145)   08/11/17  03:25    


 


Corrected Sodium  142 mmol/L (136-145)   08/11/17  03:25    


 


Potassium  4.0 mmol/L (3.5-5.1)   08/11/17  08:28    


 


Chloride  107 mmol/L ()   08/11/17  03:25    


 


Carbon Dioxide  27.3 mmol/L (21-32)   08/11/17  03:25    


 


BUN  14 mg/dL (7-18)   08/11/17  03:25    


 


Creatinine  1.16 mg/dL (0.55-1.02)  H  08/11/17  03:25    


 


Est GFR (MDRD) Af Amer  58  (>60)  L  08/11/17  03:25    


 


Est GFR (MDRD) Non-Af  48  (>60)  L  08/11/17  03:25    


 


Glucose  115 mg/dL (65-99)  H  08/11/17  03:25    


 


Calcium  7.1 mg/dL (8.5-10.1)  L  08/11/17  03:25    


 


Corrected Calcium  8.3 mg/dL (8.5-10.1)  L  08/11/17  03:25    


 


Total Bilirubin  1.20 mg/dL (0.2-1.0)  H  08/11/17  03:25    


 


AST  240 Units/L (15-37)  H  08/11/17  03:25    


 


ALT  257 Units/L (12-78)  H  08/11/17  03:25    


 


Alkaline Phosphatase  100 Units/L ()   08/11/17  03:25    


 


Total Protein  5.3 g/dL (6.4-8.2)  L  08/11/17  03:25    


 


Albumin  2.5 g/dL (3.4-5.0)  L  08/11/17  03:25    


 


Globulin  2.8 g/dL (2.5-4.5)   08/11/17  03:25    


 


Albumin/Globulin Ratio  0.9 Ratio (1.1-2.1)  L  08/11/17  03:25    


 


Amylase  14 Units/L ()  L  08/08/17  13:35    


 


Lipase  50 Units/L ()  L  08/08/17  13:35    


 


Stool Description  25g brown mucoid   08/11/17  08:17    


 


Stl Occult Blood (IFOB)  Negative  (NEGATIVE)   08/11/17  08:17    


 


Stool for White Cells  No wbc's seen  (None)   08/11/17  08:17    


 


Stl C. diff Tox B Gene  Negative  (NEGATIVE)   08/11/17  08:17    


 


Stl C. diff 027-[id number]  Negative  (NEGATIVE)   08/11/17  08:17    


 


H. pylori IgG Antibody  Negative  (NEGATIVE)   08/10/17  03:25    


 


Tissue Pathology  To follow   08/10/17  14:14    














- Plan


(1) Duodenitis


Status: Acute   


Plan: IV CIPRO AND FLAGYL, CONTINUE PAIN AND NAUSEA CONTROL.  , WILL REPEAT AM 

LABS   





(2) Nausea vomiting and diarrhea


Status: Acute   


Plan: STABLE   





(3) CHF (congestive heart failure)


Status: Chronic   


Qualifiers: 


   Congestive heart failure type: C   Congestive heart failure chronicity: C 


Plan: CXR q am, LASIX, STRICT I & OS   





(4) GERD (gastroesophageal reflux disease)


Status: Chronic   


Qualifiers: 


   Esophagitis presence: E 


Plan: PPI THERAPY   





(5) Hypertension


Status: Chronic   


Qualifiers: 


   Hypertension type: H 





(6) Hypothyroidism


Status: Chronic   


Qualifiers: 


   Hypothyroidism type: H

## 2017-08-12 VITALS — SYSTOLIC BLOOD PRESSURE: 147 MMHG | DIASTOLIC BLOOD PRESSURE: 70 MMHG

## 2017-08-12 LAB
ALBUMIN SERPL BCP-MCNC: 2.5 G/DL (ref 3.4–5)
ALP SERPL-CCNC: 95 UNITS/L (ref 46–116)
ALT SERPL W P-5'-P-CCNC: 222 UNITS/L (ref 12–78)
AST SERPL W P-5'-P-CCNC: 173 UNITS/L (ref 15–37)
BASOPHILS # BLD AUTO: 0.1 X10^3/UL (ref 0–0.1)
BASOPHILS NFR BLD AUTO: 0.8 % (ref 0.2–1)
BUN SERPL-MCNC: 11 MG/DL (ref 7–18)
CALCIUM ALBUM COR SERPL-SCNC: (no result) MMOL/L (ref 136–145)
CALCIUM ALBUM COR SERPL-SCNC: 8.5 MG/DL (ref 8.5–10.1)
CALCIUM SERPL-MCNC: 7.3 MG/DL (ref 8.5–10.1)
CHLORIDE SERPL-SCNC: 108 MMOL/L (ref 98–107)
CO2 SERPL-SCNC: 26.2 MMOL/L (ref 21–32)
CREAT SERPL-MCNC: 1.08 MG/DL (ref 0.55–1.02)
EGFR  BLACK RACES: > 60 (ref 60–?)
EOSINOPHIL # BLD AUTO: 0.2 X10^3/UL (ref 0–0.2)
EOSINOPHIL NFR BLD AUTO: 2.3 % (ref 0.9–2.9)
ERYTHROCYTE [DISTWIDTH] IN BLOOD BY AUTOMATED COUNT: 18.9 % (ref 11.6–16.5)
GLUCOSE BLD-SCNC: 102 MG/DL (ref 65–99)
HCT VFR BLD AUTO: 28.5 % (ref 36–47)
HGB BLD-MCNC: 8.9 G/DL (ref 12–16)
LYMPHOCYTES # BLD AUTO: 1.5 X10^3/UL (ref 1.3–2.9)
LYMPHOCYTES NFR BLD AUTO: 16.5 % (ref 21–51)
MCH RBC QN AUTO: 23 PG (ref 27–34)
MCHC RBC AUTO-ENTMCNC: 31.4 G/DL (ref 33–35)
MCV RBC AUTO: 73.5 FL (ref 80–100)
MONOCYTES # BLD AUTO: 0.9 X10^3/UL (ref 0.3–0.8)
MONOCYTES NFR BLD AUTO: 10.5 % (ref 0–13)
NEUTROPHILS # BLD AUTO: 6.2 X10^3/UL (ref 2.2–4.8)
NEUTROPHILS NFR BLD AUTO: 69.9 % (ref 42–75)
PLAT MORPH BLD: NORMAL
PLATELET # BLD: 155 X10^3/UL (ref 150–450)
PMV BLD AUTO: 10.8 FL (ref 7.4–11)
PROT SERPL-MCNC: 5.5 G/DL (ref 6.4–8.2)
RBC # BLD AUTO: 3.88 X10^6/UL (ref 3.5–5.4)
RBC MORPH BLD: (no result)
RBC MORPH BLD: SLIGHT
SODIUM SERPL-SCNC: 141 MMOL/L (ref 136–145)
WBC NRBC COR # BLD AUTO: 8.8 X10^3/UL (ref 3.6–10)

## 2017-08-12 RX ADMIN — CIPROFLOXACIN SCH MLS/HR: 2 INJECTION, SOLUTION INTRAVENOUS at 09:36

## 2017-08-12 RX ADMIN — METRONIDAZOLE SCH MLS/HR: 5 INJECTION, SOLUTION INTRAVENOUS at 09:36

## 2017-08-12 RX ADMIN — BUPROPION HYDROCHLORIDE SCH MG: 150 TABLET, FILM COATED, EXTENDED RELEASE ORAL at 09:23

## 2017-08-12 RX ADMIN — POTASSIUM CHLORIDE SCH MEQ: 10 CAPSULE, COATED, EXTENDED RELEASE ORAL at 09:23

## 2017-08-12 RX ADMIN — CARVEDILOL SCH MG: 25 TABLET, FILM COATED ORAL at 09:24

## 2017-08-12 RX ADMIN — PANTOPRAZOLE SODIUM SCH MG: 40 TABLET, DELAYED RELEASE ORAL at 09:23

## 2017-08-12 RX ADMIN — METRONIDAZOLE SCH MLS/HR: 5 INJECTION, SOLUTION INTRAVENOUS at 02:33

## 2017-08-12 RX ADMIN — GABAPENTIN SCH MG: 100 CAPSULE ORAL at 09:26

## 2017-08-12 NOTE — RAD
HISTORY:  CHF.



Study: Portable chest.



Comparison: Chest x-ray dated August 11, 2017.



Findings:



The trachea is midline.  The cardiac silhouette is enlarged but unchanged. Stable appearance of a mu
ltilead left chest cardiac pacemaker. No overt signs of failure.  The right lung is clear. Left lung
 aeration with associated pleural effusion appears unchanged. No obvious pneumothorax.  The bony tho
rax is unchanged.  



IMPRESSION: No significant change.





Reported By:Electronically Signed by HOPE BERNAL MD at 8/12/2017 7:12:41 AM

## 2017-09-17 ENCOUNTER — HOSPITAL ENCOUNTER (EMERGENCY)
Dept: HOSPITAL 24 - ER | Age: 77
Discharge: HOME | End: 2017-09-17
Payer: COMMERCIAL

## 2017-09-17 VITALS — BODY MASS INDEX: 26.9 KG/M2

## 2017-09-17 VITALS — DIASTOLIC BLOOD PRESSURE: 85 MMHG | SYSTOLIC BLOOD PRESSURE: 164 MMHG

## 2017-09-17 DIAGNOSIS — I25.9: Primary | ICD-10-CM

## 2017-09-17 DIAGNOSIS — J90: ICD-10-CM

## 2017-09-17 DIAGNOSIS — I51.7: ICD-10-CM

## 2017-09-17 LAB
ALBUMIN SERPL BCP-MCNC: 3 G/DL (ref 3.4–5)
ALP SERPL-CCNC: 98 UNITS/L (ref 46–116)
ALT SERPL W P-5'-P-CCNC: 19 UNITS/L (ref 12–78)
AST SERPL W P-5'-P-CCNC: 20 UNITS/L (ref 15–37)
BACTERIA URNS QL MICRO: (no result) /HPF
BASOPHILS # BLD AUTO: 0.1 X10^3/UL (ref 0–0.1)
BASOPHILS NFR BLD AUTO: 0.7 % (ref 0.2–1)
BILIRUB UR QL STRIP.AUTO: NEGATIVE
BNP SERPL-MCNC: > 5000 PG/ML (ref 0–79)
BUN SERPL-MCNC: 14 MG/DL (ref 7–18)
CALCIUM ALBUM COR SERPL-SCNC: (no result) MMOL/L (ref 136–145)
CALCIUM ALBUM COR SERPL-SCNC: 9.6 MG/DL (ref 8.5–10.1)
CALCIUM SERPL-MCNC: 8.8 MG/DL (ref 8.5–10.1)
CHLORIDE SERPL-SCNC: 102 MMOL/L (ref 98–107)
CK MB SERPL-MCNC: < 1 NG/ML (ref 0–4)
CK SERPL-CCNC: 36 UNITS/L (ref 26–192)
CKMB %: 2.8 % (ref ?–4)
CLARITY UR: CLEAR
CO2 SERPL-SCNC: 28.4 MMOL/L (ref 21–32)
COLOR UR AUTO: YELLOW
CREAT SERPL-MCNC: 1.49 MG/DL (ref 0.55–1.02)
EGFR  BLACK RACES: 44 (ref 60–?)
EOSINOPHIL # BLD AUTO: 0.1 X10^3/UL (ref 0–0.2)
EOSINOPHIL NFR BLD AUTO: 0.8 % (ref 0.9–2.9)
ERYTHROCYTE [DISTWIDTH] IN BLOOD BY AUTOMATED COUNT: 21.7 % (ref 11.6–16.5)
GLUCOSE UR QL STRIP.AUTO: NEGATIVE
HCT VFR BLD AUTO: 33.3 % (ref 36–47)
HGB BLD-MCNC: 10.5 G/DL (ref 12–16)
HYALINE CASTS URNS QL MICRO: (no result) /LPF
KETONES UR QL STRIP.AUTO: NEGATIVE
LEUKOCYTE ESTERASE UR QL STRIP.AUTO: (no result)
LYMPHOCYTES # BLD AUTO: 3.4 X10^3/UL (ref 1.3–2.9)
LYMPHOCYTES NFR BLD AUTO: 30.3 % (ref 21–51)
MCH RBC QN AUTO: 22 PG (ref 27–34)
MCHC RBC AUTO-ENTMCNC: 31.4 G/DL (ref 33–35)
MCV RBC AUTO: 70 FL (ref 80–100)
MONOCYTES # BLD AUTO: 1 X10^3/UL (ref 0.3–0.8)
MONOCYTES NFR BLD AUTO: 8.9 % (ref 0–13)
NEUTROPHILS # BLD AUTO: 6.7 X10^3/UL (ref 2.2–4.8)
NEUTROPHILS NFR BLD AUTO: 59.3 % (ref 42–75)
NITRITE UR QL STRIP.AUTO: NEGATIVE
PH UR STRIP.AUTO: 6 [PH] (ref 5–8)
PLAT MORPH BLD: NORMAL
PLATELET # BLD: 245 X10^3/UL (ref 150–450)
PMV BLD AUTO: 9.4 FL (ref 7.4–11)
PROT SERPL-MCNC: 7 G/DL (ref 6.4–8.2)
PROT UR QL STRIP.AUTO: (no result)
RBC # BLD AUTO: 4.76 X10^6/UL (ref 3.5–5.4)
RBC # UR STRIP.AUTO: NEGATIVE /UL
RBC #/AREA URNS HPF: (no result) /HPF
RBC MORPH BLD: (no result)
RBC MORPH BLD: SLIGHT
SODIUM SERPL-SCNC: 141 MMOL/L (ref 136–145)
SP GR UR STRIP.AUTO: 1.01 (ref 1–1.03)
SQUAMOUS URNS QL MICRO: (no result) /HPF
TROPONIN I SERPL-MCNC: < 0.02 NG/ML (ref 0–1.5)
UROBILINOGEN UR QL STRIP.AUTO: NORMAL
WBC NRBC COR # BLD AUTO: 11.4 X10^3/UL (ref 3.6–10)

## 2017-09-17 PROCEDURE — 99283 EMERGENCY DEPT VISIT LOW MDM: CPT

## 2017-09-17 PROCEDURE — 83880 ASSAY OF NATRIURETIC PEPTIDE: CPT

## 2017-09-17 PROCEDURE — 84443 ASSAY THYROID STIM HORMONE: CPT

## 2017-09-17 PROCEDURE — 82550 ASSAY OF CK (CPK): CPT

## 2017-09-17 PROCEDURE — 84484 ASSAY OF TROPONIN QUANT: CPT

## 2017-09-17 PROCEDURE — 96365 THER/PROPH/DIAG IV INF INIT: CPT

## 2017-09-17 PROCEDURE — 36415 COLL VENOUS BLD VENIPUNCTURE: CPT

## 2017-09-17 PROCEDURE — 71020: CPT

## 2017-09-17 PROCEDURE — 80053 COMPREHEN METABOLIC PANEL: CPT

## 2017-09-17 PROCEDURE — 81001 URINALYSIS AUTO W/SCOPE: CPT

## 2017-09-17 PROCEDURE — 96374 THER/PROPH/DIAG INJ IV PUSH: CPT

## 2017-09-17 PROCEDURE — 85025 COMPLETE CBC W/AUTO DIFF WBC: CPT

## 2017-09-17 PROCEDURE — 82553 CREATINE MB FRACTION: CPT

## 2017-09-17 NOTE — RAD
PA and lateral chest radiograph



Indication: Dyspnea.



Comparison: August 12, 2017.



Impression:

There is stable cardiomegaly. The tri-chamber ICD leads remain in stable position. The lungs are hype
rexpanded suggestive of COPD. There is no convincing edema, pneumonia, or pneumothorax. There is a sm
all left pleural effusion, grossly unchanged.



Reported By:Electronically Signed by JAMES FLORES MD at 9/17/2017 8:43:21 PM

## 2017-09-17 NOTE — DR.GENAD
HPI





- PCP


Primary Care Physician: Shi





- Complaint/Symptoms


Chief Complaint Doctors Comments: Patient admits to sleeping with head of bed 

elevated otherwise could not sleep. Can not walk very far before getting tired. 

She presently taking potassium supplement and lasix 80 mg bid for fluid 

retention.


Chief Complaint:: "I have been filling up with fluid. I can't lay down and I 

feel like I can't breath. I have a history of congestive heart failure. I can't 

eat, and I don't even want anything to drink anymore."





- Source


History Provided: Patient





- Mode of Arrival


Mode of Arrival: Ambulatory





- Timing


Onset of Chief Complaint: 09/10/17





PMH





- PMH


Past Medical History: Yes


Past Medical History: Anemia, Angina, Anxiety, Arthritis, CHF, Dementia, 

Depression, GERD, Hypertension, Hyperthyroidism, Hypothyroidism


Past Surgical History: Yes


Surgical History: Cholecystectomy, Hysterectomy, Ortho Surgery





- Family History


History of Family Medical Conditions: Yes


Family Medical History: Cancer





- Social History


Does patient currently use any type of tobacco product: No


Have you used tobacco products in the last 12 months: No


Type of Tobacco Use: None


Does any household member use tobacco: No


Alcohol Use: None


Do you use any recreational Drugs:: No


Lives With: Spouse


Lives Where: Home





- infectious screening


In the last 2 months have you had wt loss of >10#?: NO


Have you had fever, night sweats or hemotysis?: No


Have you traveled outside the country in the last 6 months?: No


Isolation: Standard





ROS





- Review of Systems


Eyes: No Symptoms Reported


ENTM: No Symptoms Reported


Respiratoy: Short of Breath


Cardiovascular: No Symptoms Reported


Gastrointestinal/Abdominal: No Symptoms Reported


Genitourinary: No Symptoms Reported


Neurological: No Symptoms Reported


Musculoskeletal: No Symptoms Reported


Integumentary: No Symptoms Reported


Hematologic/Lymphatic: No Symptoms Reported


Endocrine: No Symptoms Reported


Psychiatric: No Symptoms Reported


All Other Systems: Reviewed and Negative





PE





- Vital Signs


Vitals: 


 





Temperature                      97.6 F


Pulse Rate                       76


Respiratory Rate                 18


Blood Pressure [Right Arm]       149/72


Blood Pressure [Left Arm]        147/70


Blood Pressure                   164/85


O2 Sat by Pulse Oximetry         100











- General


Limitations: No Limitations


General Appearance: Alert, In No Apparent Distress





- Head


Head Exam: Normal Inspection, Atraumatic





- Eyes


Eye exam: Normal Appearance, PERRL, EOMI





- ENT


ENT Exam: Normal Exam


External Ear Exam: Normal External Inspection


TM/Canal Exam: Bilateral Normal


Nose Exam: Normal Nose Exam


Mouth Exam: Normal Inspection


Throat Exam: Normal Inspection





- Neck


Neck Exam: Normal Inspection, Other (JVD right)





- Chest


Chest Inspection: Normal Inspection





- Respiratory


Respiratory Exam: Normal Lung Sounds Bilat


Respiratory Exam: Bilateral Clear to Auscultation





- Cardiovascular


Cardiovascular Exam: Regular Rate, Normal Rhythm, +S3





- Abdominal Exam


Abdominal Exam: Normal Inspection, Normal Bowel Sounds


Abdominal Tenderness: LLQ (tenderness)





- Extremities


Extremities Exam: Edema (left lower)





- Back


Back Exam: Normal Inspection, Full ROM





- Neurologic


Neurological Exam: Alert, Oriented X3, CN II-XII Intact





- Psychiatric


Psychiatric Exam: Normal Affect





- Skin


Skin Exam: Warm, Dry





ROR





- Labs Reviewed


Result Diagrams: 


 09/17/17 20:00





 09/17/17 20:00


Laboratory: 


 











WBC  11.4 X10^3/uL (3.6-10.0)  H  09/17/17  20:00    


 


RBC  4.76 X10^6/uL (3.5-5.4)   09/17/17  20:00    


 


Hgb  10.5 g/dL (12.0-16.0)  L  09/17/17  20:00    


 


Hct  33.3 % (36.0-47.0)  L  09/17/17  20:00    


 


MCV  70.0 fL (80.0-100.0)  L  09/17/17  20:00    


 


MCH  22.0 pg (27.0-34.0)  L  09/17/17  20:00    


 


MCHC  31.4 g/dL (33.0-35.0)  L  09/17/17  20:00    


 


RDW  21.7 % (11.6-16.5)  H  09/17/17  20:00    


 


Plt Count  245 X10^3/uL (150.0-450.0)   09/17/17  20:00    


 


Plt Count Comment  Adequate  (ADEQUATE)   09/17/17  20:00    


 


MPV  9.4 fL (7.4-11.0)   09/17/17  20:00    


 


Neut %  59.3 % (42.0-75.0)   09/17/17  20:00    


 


Lymph %  30.3 % (21.0-51.0)   09/17/17  20:00    


 


Mono %  8.9 % (0.0-13.0)   09/17/17  20:00    


 


Eos %  0.8 % (0.9-2.9)  L  09/17/17  20:00    


 


Baso %  0.7 % (0.2-1.0)   09/17/17  20:00    


 


Neut #  6.7 x10^3/uL (2.2-4.8)  H  09/17/17  20:00    


 


Lymph #  3.4 X10^3/uL (1.3-2.9)  H  09/17/17  20:00    


 


Mono #  1.0 x10^3/uL (0.3-0.8)  H  09/17/17  20:00    


 


Eos #  0.1 x10^3/uL (0.0-0.2)   09/17/17  20:00    


 


Baso #  0.1 X10^3/uL (0.0-0.1)   09/17/17  20:00    


 


Absolute Nucleated RBC  0.0 /100WBC  09/17/17  20:00    


 


Plt Morphology Comment  Normal  (NORMAL)   09/17/17  20:00    


 


RBC Morphology  Abnormal  (NORMAL)  A  09/17/17  20:00    


 


Hypochromasia  1+  A  09/17/17  20:00    


 


Poikilocytosis  Slight  A  09/17/17  20:00    


 


Anisocytosis  1+  A  09/17/17  20:00    


 


[place] Cells  Noted   09/17/17  20:00    


 


Sodium  141 mmol/L (136-145)   09/17/17  20:00    


 


Corrected Sodium  TNP   09/17/17  20:00    


 


Potassium  3.7 mmol/L (3.5-5.1)   09/17/17  20:00    


 


Chloride  102 mmol/L ()   09/17/17  20:00    


 


Carbon Dioxide  28.4 mmol/L (21-32)   09/17/17  20:00    


 


BUN  14 mg/dL (7-18)   09/17/17  20:00    


 


Creatinine  1.49 mg/dL (0.55-1.02)  H  09/17/17  20:00    


 


Est GFR (MDRD) Af Amer  44  (>60)  L  09/17/17  20:00    


 


Est GFR (MDRD) Non-Af  36  (>60)  L  09/17/17  20:00    


 


Glucose  97 mg/dL (65-99)   09/17/17  20:00    


 


Calcium  8.8 mg/dL (8.5-10.1)   09/17/17  20:00    


 


Corrected Calcium  9.6 mg/dL (8.5-10.1)   09/17/17  20:00    


 


Total Bilirubin  0.90 mg/dL (0.2-1.0)   09/17/17  20:00    


 


AST  20 Units/L (15-37)   09/17/17  20:00    


 


ALT  19 Units/L (12-78)   09/17/17  20:00    


 


Alkaline Phosphatase  98 Units/L ()   09/17/17  20:00    


 


Creatine Kinase  36 Units/L ()   09/17/17  20:00    


 


CK-MB (CK-2)  < 1.0 ng/mL (0-4.0)   09/17/17  20:00    


 


CK/CKMB % Calc  2.8 % (<4)   09/17/17  20:00    


 


Troponin I  < 0.02 ng/mL (0-1.5)   09/17/17  20:00    


 


B-Natriuretic Peptide  > 5000 pg/mL (0-79)  H*  09/17/17  20:00    


 


Total Protein  7.0 g/dL (6.4-8.2)   09/17/17  20:00    


 


Albumin  3.0 g/dL (3.4-5.0)  L  09/17/17  20:00    


 


Globulin  4.0 g/dL (2.5-4.5)   09/17/17  20:00    


 


Albumin/Globulin Ratio  0.8 Ratio (1.1-2.1)  L  09/17/17  20:00    


 


TSH 3rd Generation  51.623 uIU/mL (0.358-3.74)  H  09/17/17  20:00    


 


Specimen Type  Clean catch urine   09/17/17  19:53    


 


Urine Color  Yellow  (YELLOW)   09/17/17  19:53    


 


Urine Appearance  Clear  (CLEAR)   09/17/17  19:53    


 


Urine pH  6.0  (5.0 - 8.0)   09/17/17  19:53    


 


Ur Specific Gravity  1.015  (1.000-1.030)   09/17/17  19:53    


 


Urine Protein  1+  (NEGATIVE)   09/17/17  19:53    


 


Urine Glucose (UA)  Negative  (NEGATIVE)   09/17/17  19:53    


 


Urine Ketones  Negative  (NEGATIVE)   09/17/17  19:53    


 


Urine Occult Blood  Negative  (NEGATIVE)   09/17/17  19:53    


 


Urine Nitrite  Negative  (NEGATIVE)   09/17/17  19:53    


 


Urine Bilirubin  Negative  (NEGATIVE)   09/17/17  19:53    


 


Urine Urobilinogen  Normal  (NORMAL)   09/17/17  19:53    


 


Ur Leukocyte Esterase  1+  (NEGATIVE)   09/17/17  19:53    


 


Urine RBC  None seen /HPF (NEGATIVE)   09/17/17  19:53    


 


Urine WBC  0-3 /HPF (NEGATIVE)   09/17/17  19:53    


 


Ur Squamous Epith Cells  Rare /HPF (NEGATIVE)   09/17/17  19:53    


 


Urine Bacteria  Trace /HPF (NEGATIVE)   09/17/17  19:53    


 


Hyaline Casts  Few /LPF (NEGATIVE)   09/17/17  19:53    


 


Ur Culture Indicated?  No/not indicated   09/17/17  19:53    














- XRAY


XRAY Interpreted by: Radiologist (There is stable cardiomegaly.  The tri-

chamber ICD leads remain in stable position.  The lungs are hyperexpanded 

suggestive of COPD.  There is no convincing edema,penumonia,or pneumothorax. 

There is a small left pleural efffusion, grossly unchange.)





- Diagnosis


Discharge Problem: 


 Chronic heart disease








- Discharge Plan


Condition: Stable





- Follow ups/Referrals


Follow ups/Referrals: 


ABBY SKY [Primary Care Provider] - 3 days





- Instructions

## 2017-10-07 ENCOUNTER — HOSPITAL ENCOUNTER (EMERGENCY)
Dept: HOSPITAL 24 - ER | Age: 77
Discharge: HOME | End: 2017-10-07
Payer: COMMERCIAL

## 2017-10-07 VITALS — DIASTOLIC BLOOD PRESSURE: 86 MMHG | SYSTOLIC BLOOD PRESSURE: 160 MMHG

## 2017-10-07 VITALS — BODY MASS INDEX: 23.8 KG/M2

## 2017-10-07 DIAGNOSIS — J98.11: ICD-10-CM

## 2017-10-07 DIAGNOSIS — E87.6: ICD-10-CM

## 2017-10-07 DIAGNOSIS — D64.89: ICD-10-CM

## 2017-10-07 DIAGNOSIS — I50.9: Primary | ICD-10-CM

## 2017-10-07 DIAGNOSIS — J98.4: ICD-10-CM

## 2017-10-07 LAB
ALBUMIN SERPL BCP-MCNC: 2.7 G/DL (ref 3.4–5)
ALP SERPL-CCNC: 96 UNITS/L (ref 46–116)
ALT SERPL W P-5'-P-CCNC: 15 UNITS/L (ref 12–78)
AST SERPL W P-5'-P-CCNC: 20 UNITS/L (ref 15–37)
BACTERIA URNS QL MICRO: (no result) /HPF
BASOPHILS # BLD AUTO: 0 X10^3/UL (ref 0–0.1)
BASOPHILS NFR BLD AUTO: 0.5 % (ref 0.2–1)
BILIRUB UR QL STRIP.AUTO: NEGATIVE
BNP SERPL-MCNC: 3680 PG/ML (ref 0–79)
BUN SERPL-MCNC: 10 MG/DL (ref 7–18)
CALCIUM ALBUM COR SERPL-SCNC: (no result) MMOL/L (ref 136–145)
CALCIUM ALBUM COR SERPL-SCNC: 9.6 MG/DL (ref 8.5–10.1)
CALCIUM SERPL-MCNC: 8.6 MG/DL (ref 8.5–10.1)
CHLORIDE SERPL-SCNC: 103 MMOL/L (ref 98–107)
CK MB SERPL-MCNC: < 1 NG/ML (ref 0–4)
CK SERPL-CCNC: 36 UNITS/L (ref 26–192)
CKMB %: 2.8 % (ref ?–4)
CLARITY UR: CLEAR
CO2 SERPL-SCNC: 25.2 MMOL/L (ref 21–32)
COLOR UR AUTO: YELLOW
CREAT SERPL-MCNC: 1.04 MG/DL (ref 0.55–1.02)
EGFR  BLACK RACES: > 60 (ref 60–?)
EOSINOPHIL # BLD AUTO: 0.3 X10^3/UL (ref 0–0.2)
EOSINOPHIL NFR BLD AUTO: 3.8 % (ref 0.9–2.9)
ERYTHROCYTE [DISTWIDTH] IN BLOOD BY AUTOMATED COUNT: 23.7 % (ref 11.6–16.5)
GLUCOSE UR QL STRIP.AUTO: NEGATIVE
HCT VFR BLD AUTO: 30.1 % (ref 36–47)
HGB BLD-MCNC: 9.6 G/DL (ref 12–16)
KETONES UR QL STRIP.AUTO: NEGATIVE
LEUKOCYTE ESTERASE UR QL STRIP.AUTO: NEGATIVE
LYMPHOCYTES # BLD AUTO: 1.4 X10^3/UL (ref 1.3–2.9)
LYMPHOCYTES NFR BLD AUTO: 15.2 % (ref 21–51)
MAGNESIUM SERPL-MCNC: 1.3 MG/DL (ref 1.7–2.9)
MCH RBC QN AUTO: 22.4 PG (ref 27–34)
MCHC RBC AUTO-ENTMCNC: 31.8 G/DL (ref 33–35)
MCV RBC AUTO: 70.5 FL (ref 80–100)
MONOCYTES # BLD AUTO: 0.5 X10^3/UL (ref 0.3–0.8)
MONOCYTES NFR BLD AUTO: 6.1 % (ref 0–13)
NEUTROPHILS # BLD AUTO: 6.7 X10^3/UL (ref 2.2–4.8)
NEUTROPHILS NFR BLD AUTO: 74.4 % (ref 42–75)
NITRITE UR QL STRIP.AUTO: NEGATIVE
PH UR STRIP.AUTO: 6 [PH] (ref 5–8)
PLAT MORPH BLD: NORMAL
PLATELET # BLD: 368 X10^3/UL (ref 150–450)
PMV BLD AUTO: 8.8 FL (ref 7.4–11)
PROT SERPL-MCNC: 6.8 G/DL (ref 6.4–8.2)
PROT UR QL STRIP.AUTO: NEGATIVE
RBC # BLD AUTO: 4.27 X10^6/UL (ref 3.5–5.4)
RBC # UR STRIP.AUTO: NEGATIVE /UL
RBC #/AREA URNS HPF: 0 /HPF
RBC MORPH BLD: (no result)
SODIUM SERPL-SCNC: 136 MMOL/L (ref 136–145)
SP GR UR STRIP.AUTO: 1 (ref 1–1.03)
SQUAMOUS URNS QL MICRO: (no result) /HPF
TROPONIN I SERPL-MCNC: 0.02 NG/ML (ref 0–1.5)
UROBILINOGEN UR QL STRIP.AUTO: NORMAL
WBC NRBC COR # BLD AUTO: 9 X10^3/UL (ref 3.6–10)

## 2017-10-07 PROCEDURE — 99283 EMERGENCY DEPT VISIT LOW MDM: CPT

## 2017-10-07 PROCEDURE — 93010 ELECTROCARDIOGRAM REPORT: CPT

## 2017-10-07 PROCEDURE — 80053 COMPREHEN METABOLIC PANEL: CPT

## 2017-10-07 PROCEDURE — 93005 ELECTROCARDIOGRAM TRACING: CPT

## 2017-10-07 PROCEDURE — 83735 ASSAY OF MAGNESIUM: CPT

## 2017-10-07 PROCEDURE — 36415 COLL VENOUS BLD VENIPUNCTURE: CPT

## 2017-10-07 PROCEDURE — 82553 CREATINE MB FRACTION: CPT

## 2017-10-07 PROCEDURE — 85378 FIBRIN DEGRADE SEMIQUANT: CPT

## 2017-10-07 PROCEDURE — 96365 THER/PROPH/DIAG IV INF INIT: CPT

## 2017-10-07 PROCEDURE — 85610 PROTHROMBIN TIME: CPT

## 2017-10-07 PROCEDURE — 84484 ASSAY OF TROPONIN QUANT: CPT

## 2017-10-07 PROCEDURE — 82550 ASSAY OF CK (CPK): CPT

## 2017-10-07 PROCEDURE — 71010: CPT

## 2017-10-07 PROCEDURE — 71275 CT ANGIOGRAPHY CHEST: CPT

## 2017-10-07 PROCEDURE — 85730 THROMBOPLASTIN TIME PARTIAL: CPT

## 2017-10-07 PROCEDURE — 81001 URINALYSIS AUTO W/SCOPE: CPT

## 2017-10-07 PROCEDURE — 86677 HELICOBACTER PYLORI ANTIBODY: CPT

## 2017-10-07 PROCEDURE — 83880 ASSAY OF NATRIURETIC PEPTIDE: CPT

## 2017-10-07 PROCEDURE — 96374 THER/PROPH/DIAG INJ IV PUSH: CPT

## 2017-10-07 PROCEDURE — 85025 COMPLETE CBC W/AUTO DIFF WBC: CPT

## 2017-10-07 NOTE — CT
HISTORY: Dyspnea and shortness of breath.



Study: CT angiogram of the chest with contrast, using the CT PE protocol. For this CT pulmonary embol
ism angiographic protocol, 3D reformats / maximum intensity projections (MIPs) of the pulmonary arter
ial circulation and pulmonary arteries was performed.



Comparison: None available.



Technique: Multiple CT angiographic axial images of the chest were obtained from the thoracic inlet t
o the upper abdomen after the administration of IV contrast. For this CT pulmonary embolism angiograp
hic protocol, 3D reformats / maximum intensity projections (MIPs) of the pulmonary arterial circulati
on and pulmonary arteries was performed.



FINDINGS:

The thoracic inlet is unremarkable the airway is patent and the aorta is not opacified adequately wit
h contrast but there is no aneurysm appreciated. There is no pulmonary embolism seen through the prox
imal segmental pulmonary artery level. The heart is mildly enlarged without a pericardial effusion di
ffuse coronary atherosclerosis is seen there is a left-sided cardiac pacer device with leads in the r
ight atrium, coronary sinus, and right ventricle. Mild-to-moderate biapical centrilobular emphysemato
us are seen throughout the lung fields. Airspace disease is seen in the lingula which has appearance 
of atelectasis. This can be followed up with short-term CT imaging for assurance, however, to exclude
 malignancy. There is a small volume left basilar effusion and left lower lobe compressive atelectasi
s/consolidation. No acute upper abdominal abnormality is seen. There is scattered aortic atherosclero
sis. The gallbladder has been surgically removed. There is no acute fracture or lytic bony lesion obs
erved. There is a small to moderate-sized hiatal hernia.



IMPRESSION:

The thoracic inlet is unremarkable; the airway is patent and the aorta is not opacified adequately wi
th contrast for the adequate assessment of an aortic dissection but there is no aneurysm appreciated.
 There is no pulmonary embolism seen through the proximal segmental pulmonary artery level. The heart
 is mildly enlarged without a pericardial effusion diffuse coronary atherosclerosis is seen there is 
a left-sided cardiac pacer device with leads in the right atrium, coronary sinus, and right ventricle
. Mild-to-moderate biapical centrilobular emphysematous are seen throughout the lung fields. Airspace
 disease is seen in the lingula which has appearance of atelectasis. This can be followed up with pascual
rt-term CT imaging for assurance, however, to exclude malignancy. There is a small volume left basila
r effusion and left lower lobe compressive atelectasis/consolidation. No acute upper abdominal abnorm
ality is seen. There is scattered aortic atherosclerosis. The gallbladder has been surgically removed
. There is no acute fracture or lytic bony lesion observed. There is a small to moderate-sized hiatal
 hernia.







Reported By:Electronically Signed by CLARK SAWYER III, MD at 10/7/2017 10:08:31 AM

## 2017-10-07 NOTE — RAD
HISTORY:  Chest pain



Study: Single-view chest. 



Comparison: 09/17/2017. 



Findings:



The trachea is midline.  The cardiac silhouette is enlarged with a tortuous thoracic aorta.  A pacing
 device overlying the left rosio thorax is observed. Background changes of COPD remain with left basil
ar pleural parenchymal disease which probably reflects a small volume of basilar effusion and consoli
dation. There is mild central vascular congestion without evidence for CHF on this examination. No ot
her changes seen. The bony thorax is stable as well.  



IMPRESSION: The cardiac silhouette is enlarged with a tortuous thoracic aorta.  A pacing device overl
tammi the left rosio thorax is observed. Background changes of COPD remain with left basilar pleural pa
renchymal disease which probably reflects a small volume of basilar effusion and consolidation. There
 is mild central vascular congestion without evidence for CHF on this examination. No other cardiopul
monary changes seen.





Reported By:Electronically Signed by CLARK SAWYER III, MD at 10/7/2017 12:26:18 PM

## 2017-10-07 NOTE — DR.GENAD
HPI





- PCP


Primary Care Physician: SATURNINO





- Complaint/Symptoms


Chief Complaint Doctors Comments: Patient complains of SOB with her being 

unable to law down for the past 2-3 weeks and her not being able to get any 

sleep.  States she has been sleeping in the chair at home and she has been 

unable to lay in bed.  States she was in the hospital three months ago and has 

been in the hospital in [place] with similar problems.  She has been having 

chest pain. Family states she has a defibrillator-pacemaker.  she has been 

having swelling of her feet and legs. states she see Dr. Hsu as her 

cardiologist.  She has not been taking her medicines because she thought she 

was doing better and she does not take her medicines everyday.  states she has 

home oxygen at two liters but does not have any nebulizers or inhalers.  She 

denies wheezing, fever, chills, cold, or cough.  Her appetite has been 

decreased. Family states she had a transfusion years ago but none recently.


Chief Complaint:: PT C/O SOB AND BILAT LOWER EXT SWELLING. PT STATES HER 

SYMPTOMS STARTED LAST NIGHT AND SHE HAS NOT BEEN ABLE TO LIE DOWN DUE TO 

FEELING LIKE SHE IS SMUTHERING. PT HAS A HISTORY OF CHF AND SHE TAKES LASIX


Self Treatment fo Chief Complaint: LASIX (THIS AM)





- Nurses notes reviewed


Nurses Notes Review: Yes





- Source


History Provided: Patient





- Mode of Arrival


Mode of Arrival: Ambulatory





- Timing


Onset of Chief Complaint: 10/06/17


Came on: Gradually





- Duration


Duration: Constant


How lon


Duration: Weeks





- Location


Location: chest pain





- Severity


Severity: Moderate





- Modifying Factors


Worsens:: nothing


Improves:: nothing





PMH





- PMH


Past Medical History: Yes


Past Medical History: Anemia, Angina, Anxiety, Arthritis, CHF, Dementia, 

Depression, GERD, Hypertension, Hyperthyroidism, Hypothyroidism


Past Surgical History: Yes


Surgical History: Cholecystectomy, Hysterectomy, Ortho Surgery





- Family History


History of Family Medical Conditions: Yes


Family Medical History: Cancer





- Social History


Does any household member use tobacco: No


Alcohol Use: None


Do you use any recreational Drugs:: No


Lives With: Family


Lives Where: Home





- infectious screening


In the last 2 months have you had wt loss of >10#?: NO


Have you had fever, night sweats or hemotysis?: No


Have you traveled outside the country in the last 6 months?: No


Isolation: Standard





ROS





- Review of Systems


Constitutional: No Symptoms Reported, Weakness, Fatigue, Loss of Appetite.  

negative: See HPI, Chills, Diaphoresis, Fever, Malaise, Irritable, Other


Eyes: No Symptoms Reported


ENTM: No Symptoms Reported.  negative: See HPI, Ear Pain, Ear Discharge, 

Pulling on Ears, Hearing Loss, Nose Pain, Nose Discharge, Epistaxis, Nose 

Congestion, Mouth Pain, Mouth Swelling, Loose Teeth, Drooling, Throat Pain, 

Throat Swelling, Ear Foreign Body


Respiratoy: No Symptoms Reported, Short of Breath.  negative: See HPI, 

Productive Cough, Non-Productive Cough, Moist Cough, Dry Cough, Hacking Cough, 

Barking Cough, Brassy Cough, Orthopnea, Stridor, Wheezing, Hemoptysis, Other


Cardiovascular: No Symptoms Reported, Chest Pain, Edema.  negative: See HPI, 

Palpitations, Syncope, Cyanosis, Skin Mottling, Other


Gastrointestinal/Abdominal: No Symptoms Reported, Abdominal Pain (epigastric 

pain).  negative: See HPI, Constipation, Diarrhea, Nausea, Vomiting, Food 

Intolerance, Other


Genitourinary: No Symptoms Reported.  negative: See HPI, Discharge, Dysuria, 

Frequency, Hematuria, Pain, Bleeding, Other


Neurological: No Symptoms Reported.  negative: See HPI, Anxiety, Depressed, 

Emotional Problems, Headache, Numbness, Paresthesia, Pre-existing Deficit, 

Seizure, Tingling, Tremors, Weakness, Dizziness, Problems Walking, Speech 

Problem, Other


Musculoskeletal: No Symptoms Reported


Integumentary: No Symptoms Reported


Hematologic/Lymphatic: No Symptoms Reported


Endocrine: No Symptoms Reported, Decreased Appetite


Psychiatric: No Symptoms Reported.  negative: See HPI, Anxiety, Depression, 

Hallucinations, Excessive crying, Suicidal, Other





PE





- Vital Signs


Vitals: 


 





Temperature                      97.5 F


Pulse Rate                       82


Respiratory Rate                 20


Blood Pressure [Right Arm]       149/72


Blood Pressure [Left Arm]        147/70


Blood Pressure                   160/86


O2 Sat by Pulse Oximetry         99











- General


Limitations: No Limitations


General Appearance: Alert, In Distress (moderate)





- Head


Head Exam: Normal Inspection, Atraumatic, Normocephalic





- Eyes


Eye exam: Normal Appearance, PERRL, EOMI.  negative: Scleral Icterus, 

Conjunctival Injection, Nystagmus, Miosis, Mydrasis, Periorbital Swelling, 

Periorbital Tenderness, Other





- ENT


ENT Exam: Normal Exam, Normal Oropharynx, Normal  External Ear Exam, Mucous 

Membranes Moist, TM's Normal Bilaterally


External Ear Exam: Normal External Inspection


TM/Canal Exam: Bilateral Normal


Nose Exam: Normal Nose Exam


Mouth Exam: Normal Inspection


Throat Exam: Normal Inspection





- Neck


Neck Exam: Normal Inspection, Full ROM, Trachea Midline.  negative: Tenderness, 

Meningismus, Lymphadenopathy, Thyromegaly, Other





- Chest


Chest Inspection: Normal Inspection, Symmetric Chest Wall Rise.  negative: 

Tenderness, Rash, Abscess, Other





- Respiratory


Respiratory Exam: Normal Lung Sounds Bilat


Respiratory Exam: Bilateral Clear to Auscultation, Left Rales (left basilar 

rales), Left Decreased Breath Sounds, Lower Rales, Lower Decreased Breath Sounds





- Cardiovascular


Cardiovascular Exam: Regular Rate, Normal Rhythm, Normal Heart Sounds, Systolic 

Murmur





- Abdominal Exam


Abdominal Exam: Normal Inspection, Normal Bowel Sounds, Soft, Tenderness (

epigastric tenderness; no organomegaly)


Abdominal Tenderness: Epigastrium, Mild





- Extremities


Extremities Exam: Normal Inspection, Full ROM, Normal Capillary Refill, Edema (2

-3+ edema lower legs and feet).  negative: Tenderness, Joint Swelling, Calf 

Tenderness, Other





- Back


Back Exam: Normal Inspection, Full ROM.  negative: Tenderness, (R) CVA 

Tenderness, (L) CVA Tenderness, Muscle Spasm, Paraspinal Tenderness, Vertebral 

Tenderness, Rashes, (R) Sciatic Notch Tenderness, (L) Sciatic Notch Tendern, (R

) Straight Leg Raise, (L) Straight Leg Raise, Other





- Neurologic


Neurological Exam: Alert, Oriented X3, CN II-XII Intact, Normal Gait, Reflexes 

Normal





- Psychiatric


Psychiatric Exam: Normal Affect, Normal Mood.  negative: Depressed, Agitated, 

Anxious, Flat Affect, Manic, Homicidal Ideation, Suicidal Ideation, Other





- Skin


Skin Exam: Warm, Dry, Intact, Normal Color





ROR





- Labs Reviewed


Laboratory Results Reviewed?: Yes (all labs and x-ray results reviewed and 

discussed with family and patient)


Result Diagrams: 


 10/07/17 07:55





 10/07/17 07:55


Laboratory: 


 











WBC  9.0 X10^3/uL (3.6-10.0)   10/07/17  07:55    


 


RBC  4.27 X10^6/uL (3.5-5.4)   10/07/17  07:55    


 


Hgb  9.6 g/dL (12.0-16.0)  L  10/07/17  07:55    


 


Hct  30.1 % (36.0-47.0)  L  10/07/17  07:55    


 


MCV  70.5 fL (80.0-100.0)  L  10/07/17  07:55    


 


MCH  22.4 pg (27.0-34.0)  L  10/07/17  07:55    


 


MCHC  31.8 g/dL (33.0-35.0)  L  10/07/17  07:55    


 


RDW  23.7 % (11.6-16.5)  H  10/07/17  07:55    


 


Plt Count  368 X10^3/uL (150.0-450.0)   10/07/17  07:55    


 


Plt Count Comment  Adequate  (ADEQUATE)   10/07/17  07:55    


 


MPV  8.8 fL (7.4-11.0)   10/07/17  07:55    


 


Neut %  74.4 % (42.0-75.0)   10/07/17  07:55    


 


Lymph %  15.2 % (21.0-51.0)  L  10/07/17  07:55    


 


Mono %  6.1 % (0.0-13.0)   10/07/17  07:55    


 


Eos %  3.8 % (0.9-2.9)  H  10/07/17  07:55    


 


Baso %  0.5 % (0.2-1.0)   10/07/17  07:55    


 


Neut #  6.7 x10^3/uL (2.2-4.8)  H  10/07/17  07:55    


 


Lymph #  1.4 X10^3/uL (1.3-2.9)   10/07/17  07:55    


 


Mono #  0.5 x10^3/uL (0.3-0.8)   10/07/17  07:55    


 


Eos #  0.3 x10^3/uL (0.0-0.2)  H  10/07/17  07:55    


 


Baso #  0.0 X10^3/uL (0.0-0.1)   10/07/17  07:55    


 


Absolute Nucleated RBC  0.0 /100WBC  10/07/17  07:55    


 


Plt Morphology Comment  Normal  (NORMAL)   10/07/17  07:55    


 


RBC Morphology  Abnormal  (NORMAL)  A  10/07/17  07:55    


 


Hypochromasia  1+  A  10/07/17  07:55    


 


Poikilocytosis  1+  A  10/07/17  07:55    


 


Anisocytosis  2+  A  10/07/17  07:55    


 


Microcytosis  1+  A  10/07/17  07:55    


 


INR Target Range  -   10/07/17  07:55    


 


INR  1.22  (0.8-1.3)   10/07/17  07:55    


 


PTT  32.4 SECONDS (22.9-36.5)   10/07/17  07:55    


 


PTT Comment  -   10/07/17  07:55    


 


D-Dimer  1270 ng/mL (0-400)  H*  10/07/17  07:55    


 


Sodium  136 mmol/L (136-145)   10/07/17  07:55    


 


Corrected Sodium  TNP   10/07/17  07:55    


 


Potassium  3.2 mmol/L (3.5-5.1)  L  10/07/17  07:55    


 


Chloride  103 mmol/L ()   10/07/17  07:55    


 


Carbon Dioxide  25.2 mmol/L (21-32)   10/07/17  07:55    


 


BUN  10 mg/dL (7-18)   10/07/17  07:55    


 


Creatinine  1.04 mg/dL (0.55-1.02)  H  10/07/17  07:55    


 


Est GFR (MDRD) Af Amer  > 60  (>60)   10/07/17  07:55    


 


Est GFR (MDRD) Non-Af  55  (>60)  L  10/07/17  07:55    


 


Glucose  103 mg/dL (65-99)  H  10/07/17  07:55    


 


Calcium  8.6 mg/dL (8.5-10.1)   10/07/17  07:55    


 


Corrected Calcium  9.6 mg/dL (8.5-10.1)   10/07/17  07:55    


 


Magnesium  1.3 mg/dL (1.7-2.9)  L  10/07/17  07:55    


 


Total Bilirubin  1.10 mg/dL (0.2-1.0)  H  10/07/17  07:55    


 


AST  20 Units/L (15-37)   10/07/17  07:55    


 


ALT  15 Units/L (12-78)   10/07/17  07:55    


 


Alkaline Phosphatase  96 Units/L ()   10/07/17  07:55    


 


Creatine Kinase  36 Units/L ()   10/07/17  07:55    


 


CK-MB (CK-2)  < 1.0 ng/mL (0-4.0)   10/07/17  07:55    


 


CK/CKMB % Calc  2.8 % (<4)   10/07/17  07:55    


 


Troponin I  0.02 ng/mL (0-1.5)   10/07/17  07:55    


 


B-Natriuretic Peptide  3680 pg/mL (0-79)  H*  10/07/17  07:55    


 


Total Protein  6.8 g/dL (6.4-8.2)   10/07/17  07:55    


 


Albumin  2.7 g/dL (3.4-5.0)  L  10/07/17  07:55    


 


Globulin  4.1 g/dL (2.5-4.5)   10/07/17  07:55    


 


Albumin/Globulin Ratio  0.7 Ratio (1.1-2.1)  L  10/07/17  07:55    


 


Specimen Type  Clean catch urine   10/07/17  08:05    


 


Urine Color  Yellow  (YELLOW)   10/07/17  08:05    


 


Urine Appearance  Clear  (CLEAR)   10/07/17  08:05    


 


Urine pH  6.0  (5.0 - 8.0)   10/07/17  08:05    


 


Ur Specific Gravity  1.005  (1.000-1.030)   10/07/17  08:05    


 


Urine Protein  Negative  (NEGATIVE)   10/07/17  08:05    


 


Urine Glucose (UA)  Negative  (NEGATIVE)   10/07/17  08:05    


 


Urine Ketones  Negative  (NEGATIVE)   10/07/17  08:05    


 


Urine Occult Blood  Negative  (NEGATIVE)   10/07/17  08:05    


 


Urine Nitrite  Negative  (NEGATIVE)   10/07/17  08:05    


 


Urine Bilirubin  Negative  (NEGATIVE)   10/07/17  08:05    


 


Urine Urobilinogen  Normal  (NORMAL)   10/07/17  08:05    


 


Ur Leukocyte Esterase  Negative  (NEGATIVE)   10/07/17  08:05    


 


Urine RBC  0 /HPF (NEGATIVE)   10/07/17  08:05    


 


Urine WBC  0-2 /HPF (NEGATIVE)   10/07/17  08:05    


 


Ur Squamous Epith Cells  Rare /HPF (NEGATIVE)   10/07/17  08:05    


 


Urine Bacteria  Trace /HPF (NEGATIVE)   10/07/17  08:05    


 


Ur Culture Indicated?  No/not indicated   10/07/17  08:05    


 


H. pylori IgG Antibody  Negative  (NEGATIVE)   10/07/17  07:55    














- XRAY


XRAY Interpreted by: Radiologist (CTA; There is no pulmonary embolism. Heart 

mildly enlarged without pericardial effusion. Mod emphysematous changes. 

Airspace diseasein lingula appearance of atelectasis. small left basilar 

effusion. left lower lobe compresive atelectasis/consolidation)





- EKG


Rate: 77


Rhythm: Paced


ST: Nonsp





- Diagnosis


Discharge Problem: 


 CHF (congestive heart failure), Hypokalemia, Pulmonary lesion, Anemia, 

Atelectasis of left lung








- Discharge Plan


Disposition:  HOME, SELF-CARE


Condition: Stable





- Follow ups/Referrals


Follow ups/Referrals: 


YFN MATAMOROS [Primary Care Provider] - 3 days


ABBY SKY [Nurse Practitioner] - 3 days





- Instructions


Instructions:  Anemia, Nonspecific, Hypokalemia, Atelectasis, Adult, Heart 

Failure, Easy-to-Read

## 2017-11-16 ENCOUNTER — HOSPITAL ENCOUNTER (INPATIENT)
Dept: HOSPITAL 24 - MED/SURG | Age: 77
LOS: 4 days | Discharge: HOME HEALTH | DRG: 204 | End: 2017-11-20
Attending: INTERNAL MEDICINE | Admitting: INTERNAL MEDICINE
Payer: COMMERCIAL

## 2017-11-16 VITALS — BODY MASS INDEX: 25.4 KG/M2

## 2017-11-16 DIAGNOSIS — R06.02: Primary | ICD-10-CM

## 2017-11-16 DIAGNOSIS — D64.89: ICD-10-CM

## 2017-11-16 DIAGNOSIS — I10: ICD-10-CM

## 2017-11-16 DIAGNOSIS — M19.90: ICD-10-CM

## 2017-11-16 DIAGNOSIS — E87.1: ICD-10-CM

## 2017-11-16 DIAGNOSIS — I50.9: ICD-10-CM

## 2017-11-16 DIAGNOSIS — K21.9: ICD-10-CM

## 2017-11-16 DIAGNOSIS — R91.8: ICD-10-CM

## 2017-11-16 DIAGNOSIS — I25.10: ICD-10-CM

## 2017-11-16 DIAGNOSIS — N19: ICD-10-CM

## 2017-11-16 DIAGNOSIS — F32.89: ICD-10-CM

## 2017-11-16 DIAGNOSIS — R10.84: ICD-10-CM

## 2017-11-16 DIAGNOSIS — R29.6: ICD-10-CM

## 2017-11-16 DIAGNOSIS — D50.8: ICD-10-CM

## 2017-11-16 DIAGNOSIS — R53.1: ICD-10-CM

## 2017-11-16 DIAGNOSIS — Z23: ICD-10-CM

## 2017-11-16 DIAGNOSIS — E86.0: ICD-10-CM

## 2017-11-16 LAB
ALBUMIN SERPL BCP-MCNC: 2.7 G/DL (ref 3.4–5)
ALP SERPL-CCNC: 85 UNITS/L (ref 46–116)
ALT SERPL W P-5'-P-CCNC: 16 UNITS/L (ref 12–78)
AMYLASE SERPL-CCNC: 26 UNITS/L (ref 25–115)
ARTERIAL PATENCY WRIST A: (no result)
AST SERPL W P-5'-P-CCNC: 22 UNITS/L (ref 15–37)
BASE EXCESS BLDA CALC-SCNC: 1 MMOL/L (ref -2–2)
BASOPHILS # BLD AUTO: 0 X10^3/UL (ref 0–0.1)
BASOPHILS NFR BLD AUTO: 0.5 % (ref 0.2–1)
BUN SERPL-MCNC: 15 MG/DL (ref 7–18)
CALCIUM ALBUM COR SERPL-SCNC: 145 MMOL/L (ref 136–145)
CALCIUM ALBUM COR SERPL-SCNC: 9.6 MG/DL (ref 8.5–10.1)
CALCIUM SERPL-MCNC: 8.6 MG/DL (ref 8.5–10.1)
CHLORIDE SERPL-SCNC: 111 MMOL/L (ref 98–107)
CO2 SERPL-SCNC: 23.2 MMOL/L (ref 21–32)
CREAT SERPL-MCNC: 1.13 MG/DL (ref 0.55–1.02)
EGFR  BLACK RACES: > 60 (ref 60–?)
EOSINOPHIL # BLD AUTO: 0.1 X10^3/UL (ref 0–0.2)
EOSINOPHIL NFR BLD AUTO: 1.5 % (ref 0.9–2.9)
ERYTHROCYTE [DISTWIDTH] IN BLOOD BY AUTOMATED COUNT: 22.9 % (ref 11.6–16.5)
HCO3 BLDA-SCNC: 25.2 MMOL/L (ref 22–26)
HCT VFR BLD AUTO: 28.1 % (ref 36–47)
HGB BLD-MCNC: 8.9 G/DL (ref 12–16)
IRON SERPL-MCNC: 16 UG/DL (ref 50–175)
LIPASE SERPL-CCNC: 76 UNITS/L (ref 73–393)
LYMPHOCYTES # BLD AUTO: 1.8 X10^3/UL (ref 1.3–2.9)
LYMPHOCYTES NFR BLD AUTO: 24.3 % (ref 21–51)
MCH RBC QN AUTO: 23.7 PG (ref 27–34)
MCHC RBC AUTO-ENTMCNC: 31.8 G/DL (ref 33–35)
MCV RBC AUTO: 74.5 FL (ref 80–100)
MONOCYTES # BLD AUTO: 0.5 X10^3/UL (ref 0.3–0.8)
MONOCYTES NFR BLD AUTO: 6.6 % (ref 0–13)
NEUTROPHILS # BLD AUTO: 5.1 X10^3/UL (ref 2.2–4.8)
NEUTROPHILS NFR BLD AUTO: 67.1 % (ref 42–75)
PCO2 BLDA: 38 MMHG (ref 35–45)
PH BLDA: 7.43 [PH] (ref 7.35–7.45)
PLAT MORPH BLD: NORMAL
PLATELET # BLD: 220 X10^3/UL (ref 150–450)
PMV BLD AUTO: 9.4 FL (ref 7.4–11)
PO2 BLDA: 131 MMHG (ref 80–100)
PROT SERPL-MCNC: 6.2 G/DL (ref 6.4–8.2)
RBC # BLD AUTO: 3.77 X10^6/UL (ref 3.5–5.4)
RBC MORPH BLD: (no result)
SAO2 % BLDA: 99 % (ref 90–100)
SODIUM SERPL-SCNC: 145 MMOL/L (ref 136–145)
TRANSFERRIN SERPL-MCNC: 256 MG/DL (ref 202–364)
VIT B12 SERPL-MCNC: 464 PG/ML (ref 193–986)
WBC NRBC COR # BLD AUTO: 7.6 X10^3/UL (ref 3.6–10)

## 2017-11-16 PROCEDURE — 93010 ELECTROCARDIOGRAM REPORT: CPT

## 2017-11-16 PROCEDURE — G0378 HOSPITAL OBSERVATION PER HR: HCPCS

## 2017-11-16 PROCEDURE — 74177 CT ABD & PELVIS W/CONTRAST: CPT

## 2017-11-16 PROCEDURE — 93005 ELECTROCARDIOGRAM TRACING: CPT

## 2017-11-16 PROCEDURE — 94760 N-INVAS EAR/PLS OXIMETRY 1: CPT

## 2017-11-16 PROCEDURE — 83540 ASSAY OF IRON: CPT

## 2017-11-16 PROCEDURE — 80053 COMPREHEN METABOLIC PANEL: CPT

## 2017-11-16 PROCEDURE — 82803 BLOOD GASES ANY COMBINATION: CPT

## 2017-11-16 PROCEDURE — 84132 ASSAY OF SERUM POTASSIUM: CPT

## 2017-11-16 PROCEDURE — 71020: CPT

## 2017-11-16 PROCEDURE — S0179 MEGESTROL 20 MG: HCPCS

## 2017-11-16 PROCEDURE — 82150 ASSAY OF AMYLASE: CPT

## 2017-11-16 PROCEDURE — 82746 ASSAY OF FOLIC ACID SERUM: CPT

## 2017-11-16 PROCEDURE — C9113 INJ PANTOPRAZOLE SODIUM, VIA: HCPCS

## 2017-11-16 PROCEDURE — 36600 WITHDRAWAL OF ARTERIAL BLOOD: CPT

## 2017-11-16 PROCEDURE — 85025 COMPLETE CBC W/AUTO DIFF WBC: CPT

## 2017-11-16 PROCEDURE — 84466 ASSAY OF TRANSFERRIN: CPT

## 2017-11-16 PROCEDURE — 83690 ASSAY OF LIPASE: CPT

## 2017-11-16 PROCEDURE — 90686 IIV4 VACC NO PRSV 0.5 ML IM: CPT

## 2017-11-16 PROCEDURE — 36415 COLL VENOUS BLD VENIPUNCTURE: CPT

## 2017-11-16 PROCEDURE — 71010: CPT

## 2017-11-16 PROCEDURE — 82728 ASSAY OF FERRITIN: CPT

## 2017-11-16 PROCEDURE — 82607 VITAMIN B-12: CPT

## 2017-11-16 PROCEDURE — 83735 ASSAY OF MAGNESIUM: CPT

## 2017-11-16 RX ADMIN — ATORVASTATIN CALCIUM SCH MG: 40 TABLET, FILM COATED ORAL at 20:46

## 2017-11-16 RX ADMIN — CARVEDILOL SCH MG: 6.25 TABLET, FILM COATED ORAL at 20:46

## 2017-11-16 RX ADMIN — FUROSEMIDE SCH MG: 10 INJECTION, SOLUTION INTRAMUSCULAR; INTRAVENOUS at 20:45

## 2017-11-16 RX ADMIN — SODIUM CHLORIDE SCH MLS/HR: 4.5 INJECTION, SOLUTION INTRAVENOUS at 20:47

## 2017-11-16 RX ADMIN — PANTOPRAZOLE SODIUM SCH MG: 40 INJECTION, POWDER, FOR SOLUTION INTRAVENOUS at 15:19

## 2017-11-16 RX ADMIN — TRAMADOL HYDROCHLORIDE SCH MG: 50 TABLET, FILM COATED ORAL at 20:46

## 2017-11-16 RX ADMIN — FUROSEMIDE SCH: 10 INJECTION, SOLUTION INTRAMUSCULAR; INTRAVENOUS at 20:46

## 2017-11-16 NOTE — DR.H&P
H&P





- History & Physical for Day of:


H&P Date: 11/16/17





- Chief Complaint


Chief Complaint: SOB, WEAKNESS, DEHYDRATION





- Allergies


Allergies/Adverse Reactions: 


 Allergies











Allergy/AdvReac Type Severity Reaction Status Date / Time


 


No Known Drug Allergies Allergy   Verified 08/08/17 14:14














- History of Present Illness


History of Present Illness: patient is a 77-year-old white female who was a 

direct admit from Dr. Kahn's office after presenting with complaints of 

shortness of breath at rest as well on exertion, poor appetite family reports 

weight loss.  Patient had abnormal labs revealing dehydration.  Patient was 

seen in the emergency room approximately one month ago and had an abnormal CTA 

of the chest revealing probable pneumonia versus effusion.  Patient complains 

of severe weakness, recent falls per family members and poor appetite.  Patient 

has a past medical history of coronary artery disease, hypertension, 

osteoarthritis.  Plan to admit for further evaluation of shortness of breath, 

plan to obtain admission labs EKG and chest x-ray on admission.  Gentle IV 

hydration for electrolyte imbalance.  Resume home medications.





- Past Medical History


Past Medical History: Anemia, Angina, Anxiety, Arthritis, CHF, Dementia, 

Depression, GERD, Hypertension, Hyperthyroidism, Hypothyroidism


Additional Medical History: Hiatal Hernia, Urinary Tract Infections, Urinary 

Incontinence, Fibromyalgia





- Past Surgical History


Surgical History: Cholecystectomy, Hysterectomy, Ortho Surgery


Additional Surgical History: ICD/Pacemaker, Cancer removed from Back





- Family History


Family Medical History: Cancer





- Social History


Does patient currently use any type of tobacco product: No


Have you used tobacco products in the last 12 months: No


Type of Tobacco Use: None


Does any household member use tobacco: No


Drug Use: None





- Review of Systems


Constitutional: No Symptoms Reported


Eyes: No Symptoms Reported


ENT: No Symptoms Reported


Respiratory: Shortness of Breath, SOB with Excertion, Wheezing


Cardiovascular: No Symptoms Reported


Gastrointestinal: Nausea, Vomiting


Genitourinary: No Symptoms Reported


Musculoskeletal: Back Pain


Skin: No Symptoms Reported


Neurological: Weakness





- Physical Exam


Vital Signs: 


 





Temperature                      97.7 F


Pulse Rate [Right Brachial]      72


Respiratory Rate                 20


Blood Pressure [Right Arm]       172/76


Blood Pressure [Left Arm]        147/70


Blood Pressure                   160/86


O2 Sat by Pulse Oximetry         98








Oriented: Normal


Eyes: Normal


Ear: Normal


Nose: Normal


Throat: Normal


Respiratory: RLL Diminished, LLL Diminished


Cardiovascular: Other (PACER)


Auscultation: Bowel Sounds: Normal


Palpation: Normal


Tenderness: Epigastric


Skin: Decreased Turgur


Musculoskeletal: Back:Thoracic, Back:Lumbar


Affect: Anxious, Depressed


Speech Pattern: Clear, Appropriate





- Assessment/Plan


(1) SOB (shortness of breath)


Status: Acute   


Plan: ADMIT, EKG CXR ON ADMISSION.  CBC CMP AMYLASE AND LIPASE UA ON ADMISSION.

  RESUME HOME MEDS, OCCULT STOOL, PPI THERAPY.  ANEMIA PANEL, GENTLY IV 

HYDRATION   





(2) Dehydration with hyponatremia


Status: Acute   





(3) Weakness generalized


Status: Acute   





(4) Chronic heart disease


Status: Acute   





(5) Renal failure


Status: Acute   





(6) CHF (congestive heart failure)


Status: Chronic   





(7) GERD (gastroesophageal reflux disease)


Status: Chronic   





(8) Hypertension


Status: Chronic

## 2017-11-17 LAB
ALBUMIN SERPL BCP-MCNC: 2.7 G/DL (ref 3.4–5)
ALP SERPL-CCNC: 87 UNITS/L (ref 46–116)
ALT SERPL W P-5'-P-CCNC: 14 UNITS/L (ref 12–78)
AST SERPL W P-5'-P-CCNC: 25 UNITS/L (ref 15–37)
BASOPHILS # BLD AUTO: 0.1 X10^3/UL (ref 0–0.1)
BASOPHILS NFR BLD AUTO: 0.9 % (ref 0.2–1)
BUN SERPL-MCNC: 13 MG/DL (ref 7–18)
CALCIUM ALBUM COR SERPL-SCNC: (no result) MMOL/L (ref 136–145)
CALCIUM ALBUM COR SERPL-SCNC: 9.5 MG/DL (ref 8.5–10.1)
CALCIUM SERPL-MCNC: 8.5 MG/DL (ref 8.5–10.1)
CHLORIDE SERPL-SCNC: 109 MMOL/L (ref 98–107)
CO2 SERPL-SCNC: 27.5 MMOL/L (ref 21–32)
CREAT SERPL-MCNC: 1.13 MG/DL (ref 0.55–1.02)
EGFR  BLACK RACES: > 60 (ref 60–?)
EOSINOPHIL # BLD AUTO: 0.1 X10^3/UL (ref 0–0.2)
EOSINOPHIL NFR BLD AUTO: 1.2 % (ref 0.9–2.9)
ERYTHROCYTE [DISTWIDTH] IN BLOOD BY AUTOMATED COUNT: 23 % (ref 11.6–16.5)
HCT VFR BLD AUTO: 27.4 % (ref 36–47)
HGB BLD-MCNC: 8.8 G/DL (ref 12–16)
LYMPHOCYTES # BLD AUTO: 1.8 X10^3/UL (ref 1.3–2.9)
LYMPHOCYTES NFR BLD AUTO: 24.5 % (ref 21–51)
MCH RBC QN AUTO: 24.1 PG (ref 27–34)
MCHC RBC AUTO-ENTMCNC: 32 G/DL (ref 33–35)
MCV RBC AUTO: 75.2 FL (ref 80–100)
MONOCYTES # BLD AUTO: 0.5 X10^3/UL (ref 0.3–0.8)
MONOCYTES NFR BLD AUTO: 7.5 % (ref 0–13)
NEUTROPHILS # BLD AUTO: 4.8 X10^3/UL (ref 2.2–4.8)
NEUTROPHILS NFR BLD AUTO: 65.9 % (ref 42–75)
PLAT MORPH BLD: NORMAL
PLATELET # BLD: 194 X10^3/UL (ref 150–450)
PMV BLD AUTO: 9.5 FL (ref 7.4–11)
PROT SERPL-MCNC: 6.2 G/DL (ref 6.4–8.2)
RBC # BLD AUTO: 3.65 X10^6/UL (ref 3.5–5.4)
RBC MORPH BLD: (no result)
RBC MORPH BLD: (no result)
SODIUM SERPL-SCNC: 145 MMOL/L (ref 136–145)
WBC NRBC COR # BLD AUTO: 7.2 X10^3/UL (ref 3.6–10)

## 2017-11-17 RX ADMIN — ALPRAZOLAM PRN MG: 0.25 TABLET ORAL at 20:44

## 2017-11-17 RX ADMIN — TRAMADOL HYDROCHLORIDE SCH MG: 50 TABLET, FILM COATED ORAL at 18:23

## 2017-11-17 RX ADMIN — SODIUM CHLORIDE SCH: 4.5 INJECTION, SOLUTION INTRAVENOUS at 09:45

## 2017-11-17 RX ADMIN — TRAMADOL HYDROCHLORIDE SCH MG: 50 TABLET, FILM COATED ORAL at 20:40

## 2017-11-17 RX ADMIN — ATORVASTATIN CALCIUM SCH MG: 40 TABLET, FILM COATED ORAL at 20:40

## 2017-11-17 RX ADMIN — PANTOPRAZOLE SODIUM SCH MG: 40 INJECTION, POWDER, FOR SOLUTION INTRAVENOUS at 09:24

## 2017-11-17 RX ADMIN — CARVEDILOL SCH MG: 6.25 TABLET, FILM COATED ORAL at 20:40

## 2017-11-17 RX ADMIN — TRAMADOL HYDROCHLORIDE SCH MG: 50 TABLET, FILM COATED ORAL at 09:24

## 2017-11-17 RX ADMIN — FUROSEMIDE SCH MG: 10 INJECTION, SOLUTION INTRAMUSCULAR; INTRAVENOUS at 20:41

## 2017-11-17 RX ADMIN — Medication SCH TAB: at 09:43

## 2017-11-17 RX ADMIN — FUROSEMIDE SCH MG: 10 INJECTION, SOLUTION INTRAMUSCULAR; INTRAVENOUS at 09:28

## 2017-11-17 RX ADMIN — SERTRALINE HYDROCHLORIDE SCH MG: 50 TABLET ORAL at 09:25

## 2017-11-17 RX ADMIN — MEGESTROL ACETATE SCH MG: 40 TABLET ORAL at 20:40

## 2017-11-17 RX ADMIN — TRAMADOL HYDROCHLORIDE SCH MG: 50 TABLET, FILM COATED ORAL at 14:41

## 2017-11-17 RX ADMIN — CARVEDILOL SCH MG: 6.25 TABLET, FILM COATED ORAL at 09:25

## 2017-11-17 RX ADMIN — SODIUM CHLORIDE SCH MLS/HR: 4.5 INJECTION, SOLUTION INTRAVENOUS at 16:42

## 2017-11-17 NOTE — CT
Abdomen and pelvis CT with contrast:



Indication: Abdominal pain, anemia, weight loss.



Comparison: Abdomen and pelvis CT dated August 9, 2017.



Technique: Postcontrast helical CT imaging of the abdomen and pelvis was performed utilizing 100 mL O
mnipaque 350 intravenously. Continuous transverse reconstructions and multiplanar reformations provid
ed.



Findings: Small bilateral pleural effusions are noted. The heart remains markedly enlarged. Pacemaker
 leads in coronary artery atherosclerosis noted. Small hiatal hernia is stable.



Apart from atherosclerosis and tortuosity, no vascular abnormality of the abdomen or pelvis is identi
fied. Kidneys are mildly atrophic, but are otherwise unremarkable. The remaining solid abdominal visc
era are within normal limits. Status post cholecystectomy.



There is free fluid in the pelvis as well as adjacent to gastroduodenal junction. Small amount of fat
 infiltration noted adjacent to the ascending colon as well. Otherwise, the imaged gastrointestinal t
ract is unremarkable.



Pelvic organs are within normal limits in this patient status post hysterectomy.



There is no acute skeletal abnormality or worrisome skeletal lesion.



Impression:

1. Small amount of abdominal free fluid, to include adjacent to the distal stomach and proximal duode
num. Findings could reflect a gastritis or duodenitis. 

2. Cardiomegaly and small bilateral pleural effusions.



Reported By:Electronically Signed by DAYDAY BENITEZ MD at 11/17/2017 9:15:47 PM

## 2017-11-18 LAB
ALBUMIN SERPL BCP-MCNC: 2.6 G/DL (ref 3.4–5)
ALP SERPL-CCNC: 101 UNITS/L (ref 46–116)
ALT SERPL W P-5'-P-CCNC: 14 UNITS/L (ref 12–78)
AST SERPL W P-5'-P-CCNC: 44 UNITS/L (ref 15–37)
BASOPHILS # BLD AUTO: 0.1 X10^3/UL (ref 0–0.1)
BASOPHILS NFR BLD AUTO: 0.8 % (ref 0.2–1)
BUN SERPL-MCNC: 11 MG/DL (ref 7–18)
CALCIUM ALBUM COR SERPL-SCNC: (no result) MMOL/L (ref 136–145)
CALCIUM ALBUM COR SERPL-SCNC: 9.2 MG/DL (ref 8.5–10.1)
CALCIUM SERPL-MCNC: 8.1 MG/DL (ref 8.5–10.1)
CHLORIDE SERPL-SCNC: 105 MMOL/L (ref 98–107)
CO2 SERPL-SCNC: 28.3 MMOL/L (ref 21–32)
CREAT SERPL-MCNC: 1.15 MG/DL (ref 0.55–1.02)
EGFR  BLACK RACES: 59 (ref 60–?)
EOSINOPHIL # BLD AUTO: 0.1 X10^3/UL (ref 0–0.2)
EOSINOPHIL NFR BLD AUTO: 1.5 % (ref 0.9–2.9)
ERYTHROCYTE [DISTWIDTH] IN BLOOD BY AUTOMATED COUNT: 22.5 % (ref 11.6–16.5)
HCT VFR BLD AUTO: 27.6 % (ref 36–47)
HGB BLD-MCNC: 9 G/DL (ref 12–16)
LYMPHOCYTES # BLD AUTO: 1.4 X10^3/UL (ref 1.3–2.9)
LYMPHOCYTES NFR BLD AUTO: 16.1 % (ref 21–51)
MCH RBC QN AUTO: 24.2 PG (ref 27–34)
MCHC RBC AUTO-ENTMCNC: 32.5 G/DL (ref 33–35)
MCV RBC AUTO: 74.6 FL (ref 80–100)
MONOCYTES # BLD AUTO: 0.7 X10^3/UL (ref 0.3–0.8)
MONOCYTES NFR BLD AUTO: 8.3 % (ref 0–13)
NEUTROPHILS # BLD AUTO: 6.2 X10^3/UL (ref 2.2–4.8)
NEUTROPHILS NFR BLD AUTO: 73.3 % (ref 42–75)
PLAT MORPH BLD: NORMAL
PLATELET # BLD: 221 X10^3/UL (ref 150–450)
PMV BLD AUTO: 9.9 FL (ref 7.4–11)
PROT SERPL-MCNC: 6.2 G/DL (ref 6.4–8.2)
RBC # BLD AUTO: 3.7 X10^6/UL (ref 3.5–5.4)
RBC MORPH BLD: (no result)
SODIUM SERPL-SCNC: 142 MMOL/L (ref 136–145)
WBC NRBC COR # BLD AUTO: 8.5 X10^3/UL (ref 3.6–10)

## 2017-11-18 RX ADMIN — CARVEDILOL SCH MG: 6.25 TABLET, FILM COATED ORAL at 20:04

## 2017-11-18 RX ADMIN — TRAMADOL HYDROCHLORIDE SCH MG: 50 TABLET, FILM COATED ORAL at 20:05

## 2017-11-18 RX ADMIN — CARVEDILOL SCH MG: 6.25 TABLET, FILM COATED ORAL at 09:43

## 2017-11-18 RX ADMIN — MEGESTROL ACETATE SCH MG: 40 TABLET ORAL at 20:05

## 2017-11-18 RX ADMIN — SERTRALINE HYDROCHLORIDE SCH MG: 50 TABLET ORAL at 09:42

## 2017-11-18 RX ADMIN — TRAMADOL HYDROCHLORIDE SCH MG: 50 TABLET, FILM COATED ORAL at 09:44

## 2017-11-18 RX ADMIN — TRAMADOL HYDROCHLORIDE SCH: 50 TABLET, FILM COATED ORAL at 14:43

## 2017-11-18 RX ADMIN — SODIUM CHLORIDE SCH: 4.5 INJECTION, SOLUTION INTRAVENOUS at 22:33

## 2017-11-18 RX ADMIN — FUROSEMIDE SCH MG: 10 INJECTION, SOLUTION INTRAMUSCULAR; INTRAVENOUS at 09:44

## 2017-11-18 RX ADMIN — POTASSIUM CHLORIDE, DEXTROSE MONOHYDRATE AND SODIUM CHLORIDE PRN MLS/HR: 150; 5; 200 INJECTION, SOLUTION INTRAVENOUS at 12:30

## 2017-11-18 RX ADMIN — PANTOPRAZOLE SODIUM SCH MG: 40 INJECTION, POWDER, FOR SOLUTION INTRAVENOUS at 09:43

## 2017-11-18 RX ADMIN — Medication SCH TAB: at 09:43

## 2017-11-18 RX ADMIN — MEGESTROL ACETATE SCH MG: 40 TABLET ORAL at 09:43

## 2017-11-18 RX ADMIN — TRAMADOL HYDROCHLORIDE SCH: 50 TABLET, FILM COATED ORAL at 17:27

## 2017-11-18 RX ADMIN — ALPRAZOLAM PRN MG: 0.25 TABLET ORAL at 20:05

## 2017-11-18 RX ADMIN — SODIUM CHLORIDE SCH MLS/HR: 4.5 INJECTION, SOLUTION INTRAVENOUS at 17:25

## 2017-11-18 RX ADMIN — ATORVASTATIN CALCIUM SCH MG: 40 TABLET, FILM COATED ORAL at 20:04

## 2017-11-18 RX ADMIN — FUROSEMIDE SCH MG: 10 INJECTION, SOLUTION INTRAMUSCULAR; INTRAVENOUS at 20:05

## 2017-11-18 NOTE — RAD
HISTORY:  77-year-old female with past history of CHF and hypertension, concern for CHF exacerbation.




Study: Frontal view of the chest.



Comparison: Chest radiographs 11/16/2017



Findings:



Left chest wall AICD is stable, and continues to obscure the left lung base.



The trachea is midline.  The cardiac silhouette is stably enlarged with chronic prominence of the int
erstitium and perihilar lung markings. Reduced central vascular congestion and mild improvement in th
e pattern of aeration are evident. The lungs are clear without focal consolidation, effusion or pneum
othorax. Soft tissues are unremarkable.  Osseous structures are unremarkable.  



IMPRESSION: 

 

1.  Improved pattern of aeration with reduced central vascular congestion.







Reported By:Electronically Signed by PRISCILLA GEORGE MD at 11/18/2017 7:00:47 AM

## 2017-11-19 LAB
ALBUMIN SERPL BCP-MCNC: 2.7 G/DL (ref 3.4–5)
ALP SERPL-CCNC: 103 UNITS/L (ref 46–116)
ALT SERPL W P-5'-P-CCNC: 17 UNITS/L (ref 12–78)
AST SERPL W P-5'-P-CCNC: 31 UNITS/L (ref 15–37)
BASOPHILS # BLD AUTO: 0.1 X10^3/UL (ref 0–0.1)
BASOPHILS NFR BLD AUTO: 0.8 % (ref 0.2–1)
BUN SERPL-MCNC: 11 MG/DL (ref 7–18)
CALCIUM ALBUM COR SERPL-SCNC: (no result) MMOL/L (ref 136–145)
CALCIUM ALBUM COR SERPL-SCNC: 9.5 MG/DL (ref 8.5–10.1)
CALCIUM SERPL-MCNC: 8.5 MG/DL (ref 8.5–10.1)
CHLORIDE SERPL-SCNC: 108 MMOL/L (ref 98–107)
CO2 SERPL-SCNC: 27.6 MMOL/L (ref 21–32)
CREAT SERPL-MCNC: 1.19 MG/DL (ref 0.55–1.02)
EGFR  BLACK RACES: 57 (ref 60–?)
EOSINOPHIL # BLD AUTO: 0.1 X10^3/UL (ref 0–0.2)
EOSINOPHIL NFR BLD AUTO: 1.6 % (ref 0.9–2.9)
ERYTHROCYTE [DISTWIDTH] IN BLOOD BY AUTOMATED COUNT: 22.4 % (ref 11.6–16.5)
HCT VFR BLD AUTO: 29.8 % (ref 36–47)
HGB BLD-MCNC: 9.5 G/DL (ref 12–16)
LYMPHOCYTES # BLD AUTO: 1.6 X10^3/UL (ref 1.3–2.9)
LYMPHOCYTES NFR BLD AUTO: 20.2 % (ref 21–51)
MCH RBC QN AUTO: 23.9 PG (ref 27–34)
MCHC RBC AUTO-ENTMCNC: 31.8 G/DL (ref 33–35)
MCV RBC AUTO: 75 FL (ref 80–100)
MONOCYTES # BLD AUTO: 0.5 X10^3/UL (ref 0.3–0.8)
MONOCYTES NFR BLD AUTO: 6.9 % (ref 0–13)
NEUTROPHILS # BLD AUTO: 5.4 X10^3/UL (ref 2.2–4.8)
NEUTROPHILS NFR BLD AUTO: 70.5 % (ref 42–75)
PLAT MORPH BLD: NORMAL
PLATELET # BLD: 237 X10^3/UL (ref 150–450)
PMV BLD AUTO: 9.9 FL (ref 7.4–11)
PROT SERPL-MCNC: 6.4 G/DL (ref 6.4–8.2)
RBC # BLD AUTO: 3.97 X10^6/UL (ref 3.5–5.4)
RBC MORPH BLD: (no result)
RBC MORPH BLD: SLIGHT
RBC MORPH BLD: SLIGHT
SODIUM SERPL-SCNC: 144 MMOL/L (ref 136–145)
WBC NRBC COR # BLD AUTO: 7.7 X10^3/UL (ref 3.6–10)

## 2017-11-19 RX ADMIN — SERTRALINE HYDROCHLORIDE SCH MG: 50 TABLET ORAL at 09:36

## 2017-11-19 RX ADMIN — ATORVASTATIN CALCIUM SCH MG: 40 TABLET, FILM COATED ORAL at 21:03

## 2017-11-19 RX ADMIN — PANTOPRAZOLE SODIUM SCH MG: 40 INJECTION, POWDER, FOR SOLUTION INTRAVENOUS at 09:36

## 2017-11-19 RX ADMIN — CARVEDILOL SCH MG: 6.25 TABLET, FILM COATED ORAL at 21:03

## 2017-11-19 RX ADMIN — SODIUM CHLORIDE SCH: 4.5 INJECTION, SOLUTION INTRAVENOUS at 01:19

## 2017-11-19 RX ADMIN — TRAMADOL HYDROCHLORIDE SCH: 50 TABLET, FILM COATED ORAL at 12:47

## 2017-11-19 RX ADMIN — ALPRAZOLAM PRN MG: 0.25 TABLET ORAL at 21:03

## 2017-11-19 RX ADMIN — FUROSEMIDE SCH MG: 10 INJECTION, SOLUTION INTRAMUSCULAR; INTRAVENOUS at 16:47

## 2017-11-19 RX ADMIN — SODIUM CHLORIDE SCH: 4.5 INJECTION, SOLUTION INTRAVENOUS at 22:50

## 2017-11-19 RX ADMIN — POTASSIUM CHLORIDE, DEXTROSE MONOHYDRATE AND SODIUM CHLORIDE PRN MLS/HR: 150; 5; 200 INJECTION, SOLUTION INTRAVENOUS at 09:46

## 2017-11-19 RX ADMIN — CARVEDILOL SCH MG: 6.25 TABLET, FILM COATED ORAL at 09:35

## 2017-11-19 RX ADMIN — MEGESTROL ACETATE SCH MG: 40 TABLET ORAL at 21:02

## 2017-11-19 RX ADMIN — Medication SCH TAB: at 09:35

## 2017-11-19 RX ADMIN — TRAMADOL HYDROCHLORIDE SCH MG: 50 TABLET, FILM COATED ORAL at 21:02

## 2017-11-19 RX ADMIN — MEGESTROL ACETATE SCH MG: 40 TABLET ORAL at 09:36

## 2017-11-19 RX ADMIN — TRAMADOL HYDROCHLORIDE SCH MG: 50 TABLET, FILM COATED ORAL at 09:36

## 2017-11-19 RX ADMIN — TRAMADOL HYDROCHLORIDE SCH: 50 TABLET, FILM COATED ORAL at 16:14

## 2017-11-19 RX ADMIN — FUROSEMIDE SCH MG: 10 INJECTION, SOLUTION INTRAMUSCULAR; INTRAVENOUS at 09:35

## 2017-11-20 VITALS — SYSTOLIC BLOOD PRESSURE: 157 MMHG | DIASTOLIC BLOOD PRESSURE: 72 MMHG

## 2017-11-20 PROCEDURE — 3E0234Z INTRODUCTION OF SERUM, TOXOID AND VACCINE INTO MUSCLE, PERCUTANEOUS APPROACH: ICD-10-PCS | Performed by: INTERNAL MEDICINE

## 2017-11-20 RX ADMIN — SODIUM CHLORIDE SCH MLS/HR: 4.5 INJECTION, SOLUTION INTRAVENOUS at 06:27

## 2017-11-20 RX ADMIN — TRAMADOL HYDROCHLORIDE SCH MG: 50 TABLET, FILM COATED ORAL at 09:30

## 2017-11-20 RX ADMIN — CARVEDILOL SCH MG: 6.25 TABLET, FILM COATED ORAL at 09:30

## 2017-11-20 RX ADMIN — SERTRALINE HYDROCHLORIDE SCH MG: 50 TABLET ORAL at 09:31

## 2017-11-20 RX ADMIN — TRAMADOL HYDROCHLORIDE SCH: 50 TABLET, FILM COATED ORAL at 12:49

## 2017-11-20 RX ADMIN — PANTOPRAZOLE SODIUM SCH MG: 40 INJECTION, POWDER, FOR SOLUTION INTRAVENOUS at 09:31

## 2017-11-20 RX ADMIN — MEGESTROL ACETATE SCH MG: 40 TABLET ORAL at 09:30

## 2017-11-20 RX ADMIN — FUROSEMIDE SCH MG: 10 INJECTION, SOLUTION INTRAMUSCULAR; INTRAVENOUS at 09:29

## 2017-11-20 RX ADMIN — Medication SCH TAB: at 09:29

## 2017-11-20 NOTE — RAD
HISTORY:  Follow-up congestive heart failure



Study:  Chest PA and lateral



Comparison: 11/18/2017







Findings:



There is a pacemaker present on the left partially obscuring the left lower lobe. The heart is enlarg
ed. No definite congestive heart failure is identified on today's examination. No acute alveolar infi
ltrates are identified. A small left pleural effusion is likely. The bony thorax is unremarkable.



IMPRESSION:

 

Cardiomegaly without congestive heart failure



Small left pleural effusion







Reported By:Electronically Signed by MARY NORMAN MD at 11/20/2017 6:26:47 AM

## 2018-02-17 ENCOUNTER — HOSPITAL ENCOUNTER (EMERGENCY)
Dept: HOSPITAL 24 - ER | Age: 78
LOS: 1 days | Discharge: HOME | End: 2018-02-18
Payer: COMMERCIAL

## 2018-02-17 VITALS — BODY MASS INDEX: 20.5 KG/M2

## 2018-02-17 DIAGNOSIS — R40.4: Primary | ICD-10-CM

## 2018-02-17 DIAGNOSIS — R55: ICD-10-CM

## 2018-02-17 DIAGNOSIS — E87.6: ICD-10-CM

## 2018-02-17 LAB
ALBUMIN SERPL BCP-MCNC: 2.6 G/DL (ref 3.4–5)
ALP SERPL-CCNC: 143 UNITS/L (ref 46–116)
ALT SERPL W P-5'-P-CCNC: 17 UNITS/L (ref 12–78)
AST SERPL W P-5'-P-CCNC: 21 UNITS/L (ref 15–37)
BASOPHILS # BLD AUTO: 0 X10^3/UL (ref 0–0.1)
BASOPHILS NFR BLD AUTO: 0.7 % (ref 0.2–1)
BUN SERPL-MCNC: 10 MG/DL (ref 7–18)
CALCIUM ALBUM COR SERPL-SCNC: 144 MMOL/L (ref 136–145)
CALCIUM ALBUM COR SERPL-SCNC: 8.9 MG/DL (ref 8.5–10.1)
CALCIUM SERPL-MCNC: 7.8 MG/DL (ref 8.5–10.1)
CHLORIDE SERPL-SCNC: 99 MMOL/L (ref 98–107)
CK MB SERPL-MCNC: < 1 NG/ML (ref 0–4)
CK SERPL-CCNC: 82 UNITS/L (ref 26–192)
CKMB %: 1.2 % (ref ?–4)
CO2 SERPL-SCNC: 38.8 MMOL/L (ref 21–32)
CREAT SERPL-MCNC: 1.14 MG/DL (ref 0.55–1.02)
EGFR  BLACK RACES: 59 (ref 60–?)
EOSINOPHIL # BLD AUTO: 0.1 X10^3/UL (ref 0–0.2)
EOSINOPHIL NFR BLD AUTO: 1.2 % (ref 0.9–2.9)
ERYTHROCYTE [DISTWIDTH] IN BLOOD BY AUTOMATED COUNT: 17.3 % (ref 11.6–16.5)
HCT VFR BLD AUTO: 42.4 % (ref 36–47)
HGB BLD-MCNC: 14.1 G/DL (ref 12–16)
LYMPHOCYTES # BLD AUTO: 1.1 X10^3/UL (ref 1.3–2.9)
LYMPHOCYTES NFR BLD AUTO: 17.5 % (ref 21–51)
MCH RBC QN AUTO: 28.2 PG (ref 27–34)
MCHC RBC AUTO-ENTMCNC: 33.2 G/DL (ref 33–35)
MCV RBC AUTO: 85 FL (ref 80–100)
MONOCYTES # BLD AUTO: 0.5 X10^3/UL (ref 0.3–0.8)
MONOCYTES NFR BLD AUTO: 8 % (ref 0–13)
NEUTROPHILS # BLD AUTO: 4.6 X10^3/UL (ref 2.2–4.8)
NEUTROPHILS NFR BLD AUTO: 72.6 % (ref 42–75)
PLATELET # BLD: 237 X10^3/UL (ref 150–450)
PMV BLD AUTO: 9.3 FL (ref 7.4–11)
PROT SERPL-MCNC: 5.9 G/DL (ref 6.4–8.2)
RBC # BLD AUTO: 4.98 X10^6/UL (ref 3.5–5.4)
SODIUM SERPL-SCNC: 143 MMOL/L (ref 136–145)
TROPONIN I SERPL-MCNC: 0.04 NG/ML (ref 0–1.5)
WBC NRBC COR # BLD AUTO: 6.4 X10^3/UL (ref 3.6–10)

## 2018-02-17 PROCEDURE — 71045 X-RAY EXAM CHEST 1 VIEW: CPT

## 2018-02-17 PROCEDURE — 96367 TX/PROPH/DG ADDL SEQ IV INF: CPT

## 2018-02-17 PROCEDURE — 93010 ELECTROCARDIOGRAM REPORT: CPT

## 2018-02-17 PROCEDURE — 36415 COLL VENOUS BLD VENIPUNCTURE: CPT

## 2018-02-17 PROCEDURE — 99283 EMERGENCY DEPT VISIT LOW MDM: CPT

## 2018-02-17 PROCEDURE — 51702 INSERT TEMP BLADDER CATH: CPT

## 2018-02-17 PROCEDURE — 84132 ASSAY OF SERUM POTASSIUM: CPT

## 2018-02-17 PROCEDURE — 84484 ASSAY OF TROPONIN QUANT: CPT

## 2018-02-17 PROCEDURE — 99285 EMERGENCY DEPT VISIT HI MDM: CPT

## 2018-02-17 PROCEDURE — 80053 COMPREHEN METABOLIC PANEL: CPT

## 2018-02-17 PROCEDURE — 96365 THER/PROPH/DIAG IV INF INIT: CPT

## 2018-02-17 PROCEDURE — 82553 CREATINE MB FRACTION: CPT

## 2018-02-17 PROCEDURE — 93005 ELECTROCARDIOGRAM TRACING: CPT

## 2018-02-17 PROCEDURE — 85025 COMPLETE CBC W/AUTO DIFF WBC: CPT

## 2018-02-17 PROCEDURE — 81001 URINALYSIS AUTO W/SCOPE: CPT

## 2018-02-17 PROCEDURE — 70450 CT HEAD/BRAIN W/O DYE: CPT

## 2018-02-17 PROCEDURE — 82550 ASSAY OF CK (CPK): CPT

## 2018-02-17 NOTE — RAD
HISTORY:  77-year-old female with history of CHF with fever and chills.



Study: Frontal view of the chest.



Comparison: Chest radiographs 11/19/2017



Findings:



Left chest AICD is stable



The trachea is midline.  The cardiac silhouette is significantly enlarged and stable with chronic pro
minence of the perihilar lung markings and interstitium.  The lungs are clear without focal consolida
tion, effusion or pneumothorax. Soft tissues are unremarkable.  Osseous structures are unremarkable. 
 



IMPRESSION: 

 

1.  No acute cardiopulmonary disease.







Reported By:Electronically Signed by PRISCILLA GEORGE MD at 2/17/2018 10:13:53 PM

## 2018-02-17 NOTE — DR.GENAD
HPI





- HPI Comment


HPI Comment: GLUCOSE IS NOT LOW AND BP AND O2 SAT ARE NORMAL. NO FEVER.





- Complaint/Symptoms


Chief Complaint Doctors Comments: SYNCOPAL EPISODE AT HOME WITH AMS. EMS SAID 

PATIENT WAS CONFUSE WHEN THEY ARRIVE. IN ED, STILL SLIGHT AMS. NO FEVER. DENIES 

CHEST PAIN.





- Nurses notes reviewed


Nurses Notes Review: Yes





- Source


History Provided: Patient, Family Member





- Mode of Arrival


Mode of Arrival: Stretcher





- Timing


Came on: Suddenly





- Duration


Duration: Since Onset


Duration: Hours





- Severity


Severity: Moderate





PMH





- PMH


Past Medical History: Anemia, Angina, Anxiety, Arthritis, CHF, Dementia, 

Depression, GERD, Hypertension, Hyperthyroidism, Hypothyroidism


Past Surgical History: Yes


Surgical History: Cholecystectomy, Hysterectomy, Ortho Surgery, Other





- Family History


Family Medical History: Cancer





- Social History


Do you use any recreational Drugs:: No





ROS





- Review of Systems


Constitutional: No Symptoms Reported


Eyes: No Symptoms Reported


ENTM: No Symptoms Reported


Respiratoy: No Symptoms Reported


Cardiovascular: No Symptoms Reported


Gastrointestinal/Abdominal: No Symptoms Reported


Genitourinary: No Symptoms Reported


Neurological: No Symptoms Reported


Musculoskeletal: No Symptoms Reported


Integumentary: No Symptoms Reported


Hematologic/Lymphatic: No Symptoms Reported


Endocrine: No Symptoms Reported


All Other Systems: Reviewed and Negative





PE





- Vital Signs


Vitals: 


 





Temperature                      97.4 F


Pulse Rate [Apical]              63


Pulse Rate [Right Brachial]      61


Pulse Rate                       70


Respiratory Rate                 16


Blood Pressure [Right Arm]       135/65


Blood Pressure [Left Arm]        159/73


Blood Pressure                   119/56


O2 Sat by Pulse Oximetry         95











- General


Limitations: No Limitations


General Appearance: Alert





- Head


Head Exam: Normal Inspection





- Eyes


Eye exam: Normal Appearance





- ENT


ENT Exam: Normal  External Ear Exam


External Ear Exam: Normal External Inspection


TM/Canal Exam: Bilateral Normal


Mouth Exam: Normal Inspection


Throat Exam: Normal Inspection





- Neck


Neck Exam: Trachea Midline





- Chest


Chest Inspection: Symmetric Chest Wall Rise





- Respiratory


Respiratory Exam: Normal Lung Sounds Bilat


Respiratory Exam: Bilateral Clear to Auscultation





- Cardiovascular


Cardiovascular Exam: Regular Rate, Normal Rhythm, Normal Heart Sounds





- Abdominal Exam


Abdominal Exam: Normal Bowel Sounds, Soft.  negative: Tenderness





- Extremities


Extremities Exam: Normal Inspection





- Back


Back Exam: Normal Inspection





- Neurologic


Neurological Exam: Alert, Oriented X3





- Psychiatric


Psychiatric Exam: Normal Affect, Normal Mood





- Skin


Skin Exam: Normal Color





MDM





- Additional Information


Additional Information Obtained From: Family





- Differential Diagnosis


Differential Diagnosis: SYNCOPAL EPISODE, AMS.





Course





- Consultation


Consultation Comments: DISCUSS PATIENT WITH DR. YOUNG.





- Education/Counseling


Education/Counseling: Patient, Family, Education


Educated On: Diagnosis, Needs for Follow Up





ROR





- Labs Reviewed


Laboratory Results Reviewed?: Yes


Result Diagrams: 


 02/17/18 22:30





 02/18/18 03:00


Laboratory: 


 











WBC  6.4 X10^3/uL (3.6-10.0)   02/17/18  22:30    


 


RBC  4.98 X10^6/uL (3.5-5.4)   02/17/18  22:30    


 


Hgb  14.1 g/dL (12.0-16.0)   02/17/18 22:30    


 


Hct  42.4 % (36.0-47.0)   02/17/18 22:30    


 


MCV  85.0 fL (80.0-100.0)   02/17/18 22:30    


 


MCH  28.2 pg (27.0-34.0)   02/17/18 22:30    


 


MCHC  33.2 g/dL (33.0-35.0)   02/17/18  22:30    


 


RDW  17.3 % (11.6-16.5)  H  02/17/18 22:30    


 


Plt Count  237 X10^3/uL (150.0-450.0)   02/17/18  22:30    


 


MPV  9.3 fL (7.4-11.0)   02/17/18  22:30    


 


Neut %  72.6 % (42.0-75.0)   02/17/18 22:30    


 


Lymph %  17.5 % (21.0-51.0)  L  02/17/18 22:30    


 


Mono %  8.0 % (0.0-13.0)   02/17/18  22:30    


 


Eos %  1.2 % (0.9-2.9)   02/17/18 22:30    


 


Baso %  0.7 % (0.2-1.0)   02/17/18 22:30    


 


Neut #  4.6 x10^3/uL (2.2-4.8)   02/17/18  22:30    


 


Lymph #  1.1 X10^3/uL (1.3-2.9)  L  02/17/18 22:30    


 


Mono #  0.5 x10^3/uL (0.3-0.8)   02/17/18  22:30    


 


Eos #  0.1 x10^3/uL (0.0-0.2)   02/17/18  22:30    


 


Baso #  0.0 X10^3/uL (0.0-0.1)   02/17/18  22:30    


 


Absolute Nucleated RBC  0.1 /100WBC  02/17/18  22:30    


 


Sodium  143 mmol/L (136-145)   02/17/18  22:30    


 


Corrected Sodium  144 mmol/L (136-145)   02/17/18  22:30    


 


Potassium  2.6 mmol/L (3.5-5.1)  L*  02/18/18  03:00    


 


Chloride  99 mmol/L ()   02/17/18  22:30    


 


Carbon Dioxide  38.8 mmol/L (21-32)  H  02/17/18  22:30    


 


BUN  10 mg/dL (7-18)   02/17/18  22:30    


 


Creatinine  1.14 mg/dL (0.55-1.02)  H  02/17/18  22:30    


 


Est GFR (MDRD) Af Amer  59  (>60)   02/17/18  22:30    


 


Est GFR (MDRD) Non-Af  49  (>60)  L  02/17/18  22:30    


 


Glucose  124 mg/dL (65-99)  H  02/17/18  22:30    


 


Calcium  7.8 mg/dL (8.5-10.1)  L  02/17/18  22:30    


 


Corrected Calcium  8.9 mg/dL (8.5-10.1)   02/17/18  22:30    


 


Total Bilirubin  1.60 mg/dL (0.2-1.0)  H  02/17/18  22:30    


 


AST  21 Units/L (15-37)   02/17/18  22:30    


 


ALT  17 Units/L (12-78)   02/17/18  22:30    


 


Alkaline Phosphatase  143 Units/L ()  H  02/17/18  22:30    


 


Creatine Kinase  82 Units/L ()   02/17/18  22:30    


 


CK-MB (CK-2)  < 1.0 ng/mL (0-4.0)   02/17/18  22:30    


 


CK/CKMB % Calc  1.2 % (<4)   02/17/18  22:30    


 


Troponin I  0.04 ng/mL (0-1.5)   02/17/18  22:30    


 


Total Protein  5.9 g/dL (6.4-8.2)  L  02/17/18  22:30    


 


Albumin  2.6 g/dL (3.4-5.0)  L  02/17/18  22:30    


 


Globulin  3.3 g/dL (2.5-4.5)   02/17/18  22:30    


 


Albumin/Globulin Ratio  0.8 Ratio (1.1-2.1)  L  02/17/18  22:30    


 


Specimen Type  Catherized urine   02/17/18  23:34    


 


Urine Color  Pale yellow  (YELLOW)   02/17/18  23:34    


 


Urine Appearance  Clear  (CLEAR)   02/17/18  23:34    


 


Urine pH  8.0  (5.0 - 8.0)   02/17/18  23:34    


 


Ur Specific Gravity  1.010  (1.000-1.030)   02/17/18  23:34    


 


Urine Protein  Negative  (NEGATIVE)   02/17/18  23:34    


 


Urine Glucose (UA)  Negative  (NEGATIVE)   02/17/18  23:34    


 


Urine Ketones  Negative  (NEGATIVE)   02/17/18  23:34    


 


Urine Occult Blood  Negative  (NEGATIVE)   02/17/18  23:34    


 


Urine Nitrite  Negative  (NEGATIVE)   02/17/18  23:34    


 


Urine Bilirubin  Negative  (NEGATIVE)   02/17/18  23:34    


 


Urine Urobilinogen  Normal  (NORMAL)   02/17/18  23:34    


 


Ur Leukocyte Esterase  Negative  (NEGATIVE)   02/17/18  23:34    


 


Urine RBC  None seen /HPF (NEGATIVE)   02/17/18  23:34    


 


Urine WBC  None seen /HPF (NEGATIVE)   02/17/18  23:34    


 


Ur Squamous Epith Cells  Rare /HPF (NEGATIVE)   02/17/18  23:34    


 


Urine Bacteria  Negative /HPF (NEGATIVE)   02/17/18  23:34    


 


Ur Culture Indicated?  No/not indicated   02/17/18  23:34    














- XRAY


XRAY Findings: REPORT DISCUSS WITH PATIENT





- EKG


Rhythm: Paced





- Diagnosis


Discharge Problem: 


 Hypokalemia





Syncope


Qualifiers:


 Syncope type: unspecified Qualified Code(s): R55 - Syncope and collapse





Altered mental state


Qualifiers:


 Altered mental status type: transient alteration of awareness Qualified Code(s)

: R40.4 - Transient alteration of awareness








- Discharge Plan


Condition: Stable





- Follow ups/Referrals


Follow ups/Referrals: 


[facility],None [Primary Care Provider] - 02/19/18





- Instructions


Instructions:  Confusion, Hypokalemia, Syncope, Easy-to-Read


Additional Instructions: 


RETURN TO ED IF WORSE.

## 2018-02-17 NOTE — CT
CT HEAD WITHOUT CONTRAST

CLINICAL HISTORY: Altered mental status

TECHNIQUE: Axial images from skull base to the vertex without administration of IV contrast material.


COMPARISON: 08/26/2013

FINDINGS:

No evidence of acute intracranial hemorrhage, mass, mass effect, midline shift or abnormal ventricula
r dilatation.

Visualized paranasal sinuses and mastoid air cell all s are unremarkable.

IMPRESSION:

No acute intracranial process.





 

Reported By:Electronically Signed by TADEO ARROYO MD at 2/17/2018 10:16:43 PM

## 2018-02-18 VITALS — SYSTOLIC BLOOD PRESSURE: 135 MMHG | DIASTOLIC BLOOD PRESSURE: 65 MMHG

## 2018-02-18 LAB
BACTERIA URNS QL MICRO: NEGATIVE /HPF
BILIRUB UR QL STRIP.AUTO: NEGATIVE
CLARITY UR: CLEAR
COLOR UR AUTO: (no result)
GLUCOSE UR QL STRIP.AUTO: NEGATIVE
KETONES UR QL STRIP.AUTO: NEGATIVE
LEUKOCYTE ESTERASE UR QL STRIP.AUTO: NEGATIVE
NITRITE UR QL STRIP.AUTO: NEGATIVE
PH UR STRIP.AUTO: 8 [PH] (ref 5–8)
PROT UR QL STRIP.AUTO: NEGATIVE
RBC # UR STRIP.AUTO: NEGATIVE /UL
RBC #/AREA URNS HPF: (no result) /HPF
SP GR UR STRIP.AUTO: 1.01 (ref 1–1.03)
SQUAMOUS URNS QL MICRO: (no result) /HPF
UROBILINOGEN UR QL STRIP.AUTO: NORMAL

## 2018-04-16 ENCOUNTER — HOSPITAL ENCOUNTER (INPATIENT)
Dept: HOSPITAL 24 - MED/SURG | Age: 78
LOS: 6 days | Discharge: HOME HEALTH | DRG: 603 | End: 2018-04-22
Attending: INTERNAL MEDICINE | Admitting: INTERNAL MEDICINE
Payer: COMMERCIAL

## 2018-04-16 VITALS — BODY MASS INDEX: 29.2 KG/M2

## 2018-04-16 DIAGNOSIS — R06.02: ICD-10-CM

## 2018-04-16 DIAGNOSIS — L03.115: ICD-10-CM

## 2018-04-16 DIAGNOSIS — I10: ICD-10-CM

## 2018-04-16 DIAGNOSIS — L03.116: Primary | ICD-10-CM

## 2018-04-16 DIAGNOSIS — E86.0: ICD-10-CM

## 2018-04-16 DIAGNOSIS — R41.82: ICD-10-CM

## 2018-04-16 DIAGNOSIS — I25.10: ICD-10-CM

## 2018-04-16 DIAGNOSIS — F01.50: ICD-10-CM

## 2018-04-16 DIAGNOSIS — K21.9: ICD-10-CM

## 2018-04-16 DIAGNOSIS — R26.89: ICD-10-CM

## 2018-04-16 DIAGNOSIS — E03.8: ICD-10-CM

## 2018-04-16 DIAGNOSIS — R53.1: ICD-10-CM

## 2018-04-16 DIAGNOSIS — E87.6: ICD-10-CM

## 2018-04-16 DIAGNOSIS — R19.7: ICD-10-CM

## 2018-04-16 DIAGNOSIS — I50.9: ICD-10-CM

## 2018-04-16 DIAGNOSIS — M19.90: ICD-10-CM

## 2018-04-16 DIAGNOSIS — R62.7: ICD-10-CM

## 2018-04-16 DIAGNOSIS — R94.4: ICD-10-CM

## 2018-04-16 DIAGNOSIS — B95.62: ICD-10-CM

## 2018-04-16 DIAGNOSIS — Z95.0: ICD-10-CM

## 2018-04-16 LAB
ALBUMIN SERPL BCP-MCNC: 3.1 G/DL (ref 3.4–5)
ALP SERPL-CCNC: 141 UNITS/L (ref 46–116)
ALT SERPL W P-5'-P-CCNC: 17 UNITS/L (ref 12–78)
AST SERPL W P-5'-P-CCNC: 20 UNITS/L (ref 15–37)
BASOPHILS # BLD AUTO: 0.1 X10^3/UL (ref 0–0.1)
BASOPHILS NFR BLD AUTO: 1.1 % (ref 0.2–1)
BNP SERPL-MCNC: 1650 PG/ML (ref 0–79)
BUN SERPL-MCNC: 18 MG/DL (ref 7–18)
CALCIUM ALBUM COR SERPL-SCNC: 144 MMOL/L (ref 136–145)
CALCIUM ALBUM COR SERPL-SCNC: 8.3 MG/DL (ref 8.5–10.1)
CALCIUM SERPL-MCNC: 7.6 MG/DL (ref 8.5–10.1)
CHLORIDE SERPL-SCNC: 106 MMOL/L (ref 98–107)
CK MB SERPL-MCNC: 2.8 NG/ML (ref 0–4)
CK SERPL-CCNC: 74 UNITS/L (ref 26–192)
CKMB %: 3.8 % (ref ?–4)
CO2 SERPL-SCNC: 28 MMOL/L (ref 21–32)
CREAT SERPL-MCNC: 1.3 MG/DL (ref 0.55–1.02)
EGFR  BLACK RACES: 51 (ref 60–?)
EOSINOPHIL # BLD AUTO: 0 X10^3/UL (ref 0–0.2)
EOSINOPHIL NFR BLD AUTO: 0.6 % (ref 0.9–2.9)
ERYTHROCYTE [DISTWIDTH] IN BLOOD BY AUTOMATED COUNT: 17.9 % (ref 11.6–16.5)
HCT VFR BLD AUTO: 39 % (ref 36–47)
HGB BLD-MCNC: 12.6 G/DL (ref 12–16)
IRON SERPL-MCNC: 84 UG/DL (ref 50–175)
LYMPHOCYTES # BLD AUTO: 1.2 X10^3/UL (ref 1.3–2.9)
LYMPHOCYTES NFR BLD AUTO: 18.1 % (ref 21–51)
MCH RBC QN AUTO: 28.7 PG (ref 27–34)
MCHC RBC AUTO-ENTMCNC: 32.2 G/DL (ref 33–35)
MCV RBC AUTO: 89 FL (ref 80–100)
MONOCYTES # BLD AUTO: 0.4 X10^3/UL (ref 0.3–0.8)
MONOCYTES NFR BLD AUTO: 6.6 % (ref 0–13)
NEUTROPHILS # BLD AUTO: 4.9 X10^3/UL (ref 2.2–4.8)
NEUTROPHILS NFR BLD AUTO: 73.6 % (ref 42–75)
PLATELET # BLD: 248 X10^3/UL (ref 150–450)
PMV BLD AUTO: 9.2 FL (ref 7.4–11)
PROT SERPL-MCNC: 6.7 G/DL (ref 6.4–8.2)
RBC # BLD AUTO: 4.38 X10^6/UL (ref 3.5–5.4)
SODIUM SERPL-SCNC: 143 MMOL/L (ref 136–145)
T4 FREE SERPL-MCNC: 1.16 NG/DL (ref 0.76–1.46)
TRANSFERRIN SERPL-MCNC: 203 MG/DL (ref 202–364)
TROPONIN I SERPL-MCNC: 0.05 NG/ML (ref 0–1.5)
TSH SERPL DL<=0.05 MIU/L-ACNC: 23.93 UIU/ML (ref 0.36–3.74)
VIT B12 SERPL-MCNC: 428 PG/ML (ref 193–986)
WBC NRBC COR # BLD AUTO: 6.6 X10^3/UL (ref 3.6–10)

## 2018-04-16 PROCEDURE — 94640 AIRWAY INHALATION TREATMENT: CPT

## 2018-04-16 PROCEDURE — 87186 SC STD MICRODIL/AGAR DIL: CPT

## 2018-04-16 PROCEDURE — 82565 ASSAY OF CREATININE: CPT

## 2018-04-16 PROCEDURE — 94760 N-INVAS EAR/PLS OXIMETRY 1: CPT

## 2018-04-16 PROCEDURE — 85025 COMPLETE CBC W/AUTO DIFF WBC: CPT

## 2018-04-16 PROCEDURE — 80053 COMPREHEN METABOLIC PANEL: CPT

## 2018-04-16 PROCEDURE — 84466 ASSAY OF TRANSFERRIN: CPT

## 2018-04-16 PROCEDURE — 87040 BLOOD CULTURE FOR BACTERIA: CPT

## 2018-04-16 PROCEDURE — 87075 CULTR BACTERIA EXCEPT BLOOD: CPT

## 2018-04-16 PROCEDURE — 84439 ASSAY OF FREE THYROXINE: CPT

## 2018-04-16 PROCEDURE — 70450 CT HEAD/BRAIN W/O DYE: CPT

## 2018-04-16 PROCEDURE — 93005 ELECTROCARDIOGRAM TRACING: CPT

## 2018-04-16 PROCEDURE — 82728 ASSAY OF FERRITIN: CPT

## 2018-04-16 PROCEDURE — 81001 URINALYSIS AUTO W/SCOPE: CPT

## 2018-04-16 PROCEDURE — 93010 ELECTROCARDIOGRAM REPORT: CPT

## 2018-04-16 PROCEDURE — 80202 ASSAY OF VANCOMYCIN: CPT

## 2018-04-16 PROCEDURE — 36415 COLL VENOUS BLD VENIPUNCTURE: CPT

## 2018-04-16 PROCEDURE — 82607 VITAMIN B-12: CPT

## 2018-04-16 PROCEDURE — 71045 X-RAY EXAM CHEST 1 VIEW: CPT

## 2018-04-16 PROCEDURE — 84484 ASSAY OF TROPONIN QUANT: CPT

## 2018-04-16 PROCEDURE — 84443 ASSAY THYROID STIM HORMONE: CPT

## 2018-04-16 PROCEDURE — 83880 ASSAY OF NATRIURETIC PEPTIDE: CPT

## 2018-04-16 PROCEDURE — 87427 SHIGA-LIKE TOXIN AG IA: CPT

## 2018-04-16 PROCEDURE — 87070 CULTURE OTHR SPECIMN AEROBIC: CPT

## 2018-04-16 PROCEDURE — 82746 ASSAY OF FOLIC ACID SERUM: CPT

## 2018-04-16 PROCEDURE — 82550 ASSAY OF CK (CPK): CPT

## 2018-04-16 PROCEDURE — 87205 SMEAR GRAM STAIN: CPT

## 2018-04-16 PROCEDURE — 83735 ASSAY OF MAGNESIUM: CPT

## 2018-04-16 PROCEDURE — G0378 HOSPITAL OBSERVATION PER HR: HCPCS

## 2018-04-16 PROCEDURE — 83540 ASSAY OF IRON: CPT

## 2018-04-16 PROCEDURE — 87449 NOS EACH ORGANISM AG IA: CPT

## 2018-04-16 PROCEDURE — 87338 HPYLORI STOOL AG IA: CPT

## 2018-04-16 PROCEDURE — S0028 INJECTION, FAMOTIDINE, 20 MG: HCPCS

## 2018-04-16 PROCEDURE — 87077 CULTURE AEROBIC IDENTIFY: CPT

## 2018-04-16 PROCEDURE — 87045 FECES CULTURE AEROBIC BACT: CPT

## 2018-04-16 PROCEDURE — 82274 ASSAY TEST FOR BLOOD FECAL: CPT

## 2018-04-16 PROCEDURE — 83630 LACTOFERRIN FECAL (QUAL): CPT

## 2018-04-16 PROCEDURE — 82553 CREATINE MB FRACTION: CPT

## 2018-04-16 PROCEDURE — 87493 C DIFF AMPLIFIED PROBE: CPT

## 2018-04-16 RX ADMIN — CARVEDILOL SCH MG: 25 TABLET, FILM COATED ORAL at 20:55

## 2018-04-16 RX ADMIN — CLONAZEPAM SCH MG: 0.5 TABLET ORAL at 20:55

## 2018-04-16 RX ADMIN — MAGNESIUM SULFATE HEPTAHYDRATE PRN MLS/HR: 1 INJECTION, SOLUTION INTRAVENOUS at 20:53

## 2018-04-16 RX ADMIN — SODIUM CHLORIDE SCH MLS/HR: 9 INJECTION, SOLUTION INTRAVENOUS at 20:53

## 2018-04-16 RX ADMIN — POTASSIUM CHLORIDE AND SODIUM CHLORIDE SCH MLS/HR: 450; 150 INJECTION, SOLUTION INTRAVENOUS at 18:24

## 2018-04-16 NOTE — RAD
HISTORY:  Shortness of breath, CHF, edema



Study: Single view chest



Comparison: 02/17/2018



Findings:



Single view is submitted demonstrating AICD in stable position. No infiltrate, effusion or pneumothor
ax identified. There is chronic cardiomegaly.  The soft tissues are unremarkable. 

 



IMPRESSION: 

 

1. Cardiomegaly without acute findings.







Reported By:Electronically Signed by CATHERINE DEWITT MD at 4/16/2018 2:59:43 PM

## 2018-04-16 NOTE — DR.H&P
H&P





- History & Physical for Day of:


H&P Date: 04/16/18





- Chief Complaint


Chief Complaint: SOB, WEAKNESS, LOWER EXTREMITY SWELLING WITH BLISTERS





- Allergies


Allergies/Adverse Reactions: 


 Allergies











Allergy/AdvReac Type Severity Reaction Status Date / Time


 


No Known Drug Allergies Allergy   Verified 04/16/18 14:34














- History of Present Illness


History of Present Illness: 78 WF DIRECT ADMIT FROM DR WOODALL OFFICE AFTER 

PRESENTING WITH CO SOB, GENERALIZED WEAKNESS AND SWELLING TO LOWER LEGS WITH 

REDNESS AND BLISTERS. PT STATES SHE HAS BEEN TAKING LASIX AT HOME FOR SWELLING 

WITHOUT IMPROVEMENT. PT HAS PMH OF CHF, CAD, HTN, ANEMIA. PT ADMITTED FOR 

EVALUATION AND TREATMENT OF SOB AND LOWER EXTREMITY CELLULITIS.





- Past Medical History


Past Medical History: Anemia, Angina, Anxiety, Arthritis, CHF, Dementia, 

Depression, GERD, Hypertension, Hyperthyroidism, Hypothyroidism


Additional Medical History: Hiatal Hernia, Urinary Tract Infections, Urinary 

Incontinence, Fibromyalgia





- Past Surgical History


Surgical History: Cholecystectomy, Hysterectomy, Ortho Surgery, Other


Additional Surgical History: ICD/Pacemaker, Cancer removed from Back





- Family History


Family Medical History: Cancer





- Social History


Does patient currently use any type of tobacco product: No


Have you used tobacco products in the last 12 months: No


Type of Tobacco Use: None


Does any household member use tobacco: No


Alcohol Use: None


Drug Use: None





- Medications


Home Medications: 


 





Carvedilol [Coreg Tab 25 mg] 25 mg PO BID 04/16/18 [History Confirmed 04/16/18]


Esomeprazole Magnesium [Nexium] 40 mg PO DAILY 04/16/18 [History Confirmed 04/16 /18]


Furosemide [Lasix] 40 mg PO BID PRN 04/16/18 [History Confirmed 04/16/18]


Levothyroxine Sodium 75 mcg PO DAILY 04/16/18 [History Confirmed 04/16/18]


Meclizine HCl 12.5 mg PO DAILY PRN 04/16/18 [History Confirmed 04/16/18]











- Review of Systems


Constitutional: Weakness, Malaise


Eyes: No Symptoms Reported


ENT: No Symptoms Reported


Respiratory: Shortness of Breath


Cardiovascular: Edema, Light Headedness


Gastrointestinal: Nausea


Genitourinary: No Symptoms Reported


Musculoskeletal: Leg Pain


Skin: Rash


Neurological: Weakness





- Physical Exam


Vital Signs: 


 





Temperature                      98.1 F


Pulse Rate [Right Brachial]      59


Respiratory Rate                 18


Blood Pressure [Right Arm]       147/63


Blood Pressure [Left Arm]        159/73


Blood Pressure                   135/65


O2 Sat by Pulse Oximetry         100








Oriented: Normal


Eyes: Normal


Ear: Normal


Nose: Normal


Throat: Normal


Respiratory: RML Diminished, LLL Diminished


Cardiovascular: Irregular, Edema


: Normal


Auscultation: Bowel Sounds: Normal


Palpation: Normal


Tenderness: Normal


Skin: Vesicular, Red, Tender


Musculoskeletal: Normal, Leg, Swelling, Tender


Affect: Anxious


Speech Pattern: Clear, Appropriate





- Assessment/Plan


(1) Cellulitis of both lower extremities


Status: Acute   


Plan: ADMIT, SERIAL CE, EKGS.  CXR ON ADMISSION, CBC CMP BNP.  STRICT I & OS, 

SUPPLEMENTAL O2.  PAIN CONTROL, BP MONITORING, WOUND CULTURES, WOUND CARE.  IV 

ATBX THERAPY, GENTLE HYDRATION, CONFIRM HOME MEDS   





(2) SOB (shortness of breath)


Status: Acute   





(3) Hypokalemia


Status: Acute   





(4) Weakness generalized


Status: Acute   





(5) CHF (congestive heart failure)


Status: Chronic   





(6) GERD (gastroesophageal reflux disease)


Status: Chronic   





(7) Hypertension


Status: Chronic   





(8) Hypothyroidism


Status: Chronic   





(9) Presence of combination internal cardiac defibrillator (ICD) and pacemaker


Status: Chronic

## 2018-04-17 LAB
ALBUMIN SERPL BCP-MCNC: 2.7 G/DL (ref 3.4–5)
ALP SERPL-CCNC: 125 UNITS/L (ref 46–116)
ALT SERPL W P-5'-P-CCNC: 14 UNITS/L (ref 12–78)
AST SERPL W P-5'-P-CCNC: 17 UNITS/L (ref 15–37)
BACTERIA URNS QL MICRO: NEGATIVE /HPF
BASOPHILS # BLD AUTO: 0.1 X10^3/UL (ref 0–0.1)
BASOPHILS NFR BLD AUTO: 0.9 % (ref 0.2–1)
BILIRUB UR QL STRIP.AUTO: NEGATIVE
BUN SERPL-MCNC: 18 MG/DL (ref 7–18)
CALCIUM ALBUM COR SERPL-SCNC: (no result) MMOL/L (ref 136–145)
CALCIUM ALBUM COR SERPL-SCNC: 8.7 MG/DL (ref 8.5–10.1)
CALCIUM SERPL-MCNC: 7.7 MG/DL (ref 8.5–10.1)
CHLORIDE SERPL-SCNC: 107 MMOL/L (ref 98–107)
CLARITY UR: CLEAR
CO2 SERPL-SCNC: 27.2 MMOL/L (ref 21–32)
COLOR UR AUTO: (no result)
CREAT SERPL-MCNC: 1.26 MG/DL (ref 0.55–1.02)
EGFR  BLACK RACES: 53 (ref 60–?)
EOSINOPHIL # BLD AUTO: 0.1 X10^3/UL (ref 0–0.2)
EOSINOPHIL NFR BLD AUTO: 1.1 % (ref 0.9–2.9)
ERYTHROCYTE [DISTWIDTH] IN BLOOD BY AUTOMATED COUNT: 17.7 % (ref 11.6–16.5)
GLUCOSE UR QL STRIP.AUTO: NEGATIVE
HCT VFR BLD AUTO: 35.2 % (ref 36–47)
HGB BLD-MCNC: 11.6 G/DL (ref 12–16)
KETONES UR QL STRIP.AUTO: NEGATIVE
LEUKOCYTE ESTERASE UR QL STRIP.AUTO: (no result)
LYMPHOCYTES # BLD AUTO: 1.3 X10^3/UL (ref 1.3–2.9)
LYMPHOCYTES NFR BLD AUTO: 18.1 % (ref 21–51)
MAGNESIUM SERPL-MCNC: 1.8 MG/DL (ref 1.7–2.9)
MCH RBC QN AUTO: 28.9 PG (ref 27–34)
MCHC RBC AUTO-ENTMCNC: 32.9 G/DL (ref 33–35)
MCV RBC AUTO: 87.8 FL (ref 80–100)
MONOCYTES # BLD AUTO: 0.6 X10^3/UL (ref 0.3–0.8)
MONOCYTES NFR BLD AUTO: 8.3 % (ref 0–13)
NEUTROPHILS # BLD AUTO: 5.3 X10^3/UL (ref 2.2–4.8)
NEUTROPHILS NFR BLD AUTO: 71.6 % (ref 42–75)
NITRITE UR QL STRIP.AUTO: NEGATIVE
PH UR STRIP.AUTO: 7 [PH] (ref 5–8)
PLATELET # BLD: 212 X10^3/UL (ref 150–450)
PMV BLD AUTO: 9.5 FL (ref 7.4–11)
PROT SERPL-MCNC: 6.1 G/DL (ref 6.4–8.2)
PROT UR QL STRIP.AUTO: NEGATIVE
RBC # BLD AUTO: 4 X10^6/UL (ref 3.5–5.4)
RBC # UR STRIP.AUTO: NEGATIVE /UL
RBC #/AREA URNS HPF: (no result) /HPF
SODIUM SERPL-SCNC: 143 MMOL/L (ref 136–145)
SP GR UR STRIP.AUTO: 1.01 (ref 1–1.03)
SQUAMOUS URNS QL MICRO: (no result) /HPF
UROBILINOGEN UR QL STRIP.AUTO: NORMAL
WBC NRBC COR # BLD AUTO: 7.4 X10^3/UL (ref 3.6–10)

## 2018-04-17 RX ADMIN — CARVEDILOL SCH MG: 25 TABLET, FILM COATED ORAL at 20:42

## 2018-04-17 RX ADMIN — POTASSIUM CHLORIDE SCH MEQ: 10 CAPSULE, COATED, EXTENDED RELEASE ORAL at 09:00

## 2018-04-17 RX ADMIN — SODIUM CHLORIDE SCH MLS/HR: 9 INJECTION, SOLUTION INTRAVENOUS at 20:42

## 2018-04-17 RX ADMIN — FUROSEMIDE SCH MG: 10 INJECTION, SOLUTION INTRAMUSCULAR; INTRAVENOUS at 20:42

## 2018-04-17 RX ADMIN — MAGNESIUM SULFATE HEPTAHYDRATE PRN MLS/HR: 1 INJECTION, SOLUTION INTRAVENOUS at 05:00

## 2018-04-17 RX ADMIN — FERROUS GLUCONATE SCH MG: 324 TABLET ORAL at 09:00

## 2018-04-17 RX ADMIN — SERTRALINE HYDROCHLORIDE SCH MG: 50 TABLET ORAL at 09:00

## 2018-04-17 RX ADMIN — CARVEDILOL SCH MG: 25 TABLET, FILM COATED ORAL at 09:00

## 2018-04-17 RX ADMIN — FUROSEMIDE SCH MG: 10 INJECTION, SOLUTION INTRAMUSCULAR; INTRAVENOUS at 10:17

## 2018-04-17 RX ADMIN — CLONAZEPAM SCH MG: 0.5 TABLET ORAL at 20:43

## 2018-04-17 RX ADMIN — SODIUM CHLORIDE SCH MLS/HR: 9 INJECTION, SOLUTION INTRAVENOUS at 09:00

## 2018-04-17 RX ADMIN — ALUMINUM HYDROXIDE AND MAGNESIUM HYDROXIDE PRN ML: 200; 200 SUSPENSION ORAL at 16:45

## 2018-04-17 RX ADMIN — POTASSIUM CHLORIDE AND SODIUM CHLORIDE SCH MLS/HR: 450; 150 INJECTION, SOLUTION INTRAVENOUS at 20:00

## 2018-04-17 NOTE — PCM.PROG
Progress Note





- Progress Note for Day of


Date: 04/17/18





- Subjective


Subjective: GENERALIZED WEAKNESS AND CONTINUED LOWER EXTREMITY EDEMA AND 

REDNESS WITH WEEPING BLISTERS. PT CURRENTLY ON IV ATBX, WOUND CARE





- Past Medical Family Social History


Past Med/Fam/Surg Hx: No changes since H&P


Allergies: 


Allergies





No Known Drug Allergies Allergy (Verified 04/16/18 14:34)


 











- Review of Systems


ROS: No change since H&P





- Vital Signs and I&O's


Vital Signs: 


 





Temperature                      98 F


Pulse Rate [Right Brachial]      55


Respiratory Rate                 20


Blood Pressure [Right Arm]       153/69


Blood Pressure [Left Arm]        159/73


Blood Pressure                   135/65


O2 Sat by Pulse Oximetry         98








Intake and Output: 


 Intake & Output











 04/15/18 04/16/18 04/17/18 04/18/18





 11:59 11:59 11:59 11:59


 


Intake Total   985 520


 


Output Total   2 300


 


Balance   983 220














- Physical Exam


Oriented: Normal


Eyes: Normal


Ear: Normal


Nose: Normal


Throat: Normal


Cardiovascular: Irregular, Edema


: Normal


Auscultation: Bowel Sounds: Normal


Tenderness: Normal


Skin: Vesicular, Red, Tender


Musculoskeletal: Normal, Leg, Swelling, Tender


Affect: Anxious


Speech Pattern: Clear





- Laboratory and Diagnostics


Result Diagrams: 


 04/17/18 04:23





 04/17/18 04:23


Labs: 


 





04/16/18 15:03   Lip   Gram Stain - Final


04/16/18 15:03   Lip   Wound Culture - Preliminary





 Laboratory











WBC  7.4 X10^3/uL (3.6-10.0)   04/17/18  04:23    


 


RBC  4.00 X10^6/uL (3.5-5.4)   04/17/18  04:23    


 


Hgb  11.6 g/dL (12.0-16.0)  L  04/17/18  04:23    


 


Hct  35.2 % (36.0-47.0)  L  04/17/18  04:23    


 


MCV  87.8 fL (80.0-100.0)   04/17/18  04:23    


 


MCH  28.9 pg (27.0-34.0)   04/17/18  04:23    


 


MCHC  32.9 g/dL (33.0-35.0)  L  04/17/18  04:23    


 


RDW  17.7 % (11.6-16.5)  H  04/17/18  04:23    


 


Plt Count  212 X10^3/uL (150.0-450.0)   04/17/18  04:23    


 


MPV  9.5 fL (7.4-11.0)   04/17/18  04:23    


 


Neut % (Auto)  71.6 % (42.0-75.0)   04/17/18  04:23    


 


Lymph % (Auto)  18.1 % (21.0-51.0)  L  04/17/18  04:23    


 


Mono % (Auto)  8.3 % (0.0-13.0)   04/17/18  04:23    


 


Eos % (Auto)  1.1 % (0.9-2.9)   04/17/18  04:23    


 


Baso % (Auto)  0.9 % (0.2-1.0)   04/17/18  04:23    


 


Neut # (Auto)  5.3 x10^3/uL (2.2-4.8)  H  04/17/18  04:23    


 


Lymph # (Auto)  1.3 X10^3/uL (1.3-2.9)   04/17/18  04:23    


 


Mono # (Auto)  0.6 x10^3/uL (0.3-0.8)   04/17/18  04:23    


 


Eos # (Auto)  0.1 x10^3/uL (0.0-0.2)   04/17/18  04:23    


 


Baso # (Auto)  0.1 X10^3/uL (0.0-0.1)   04/17/18  04:23    


 


Absolute Nucleated RBC  0.2 /100WBC  04/17/18  04:23    


 


Sodium  143 mmol/L (136-145)   04/17/18  04:23    


 


Corrected Sodium  TNP   04/17/18  04:23    


 


Potassium  3.5 mmol/L (3.5-5.1)   04/17/18  04:23    


 


Chloride  107 mmol/L ()   04/17/18  04:23    


 


Carbon Dioxide  27.2 mmol/L (21-32)   04/17/18  04:23    


 


BUN  18 mg/dL (7-18)   04/17/18  04:23    


 


Creatinine  1.26 mg/dL (0.55-1.02)  H  04/17/18  04:23    


 


Est GFR (MDRD) Af Amer  53  (>60)  L  04/17/18  04:23    


 


Est GFR (MDRD) Non-Af  44  (>60)  L  04/17/18  04:23    


 


Glucose  93 mg/dL (65-99)   04/17/18  04:23    


 


Calcium  7.7 mg/dL (8.5-10.1)  L  04/17/18  04:23    


 


Corrected Calcium  8.7 mg/dL (8.5-10.1)   04/17/18  04:23    


 


Magnesium  1.8 mg/dL (1.7-2.9)   04/17/18  04:23    


 


Iron  84 ug/dL ()   04/16/18  14:28    


 


Transferrin  203 mg/dL (202-364)   04/16/18  14:28    


 


Ferritin  149 ng/mL (8-252)   04/16/18  14:28    


 


Total Bilirubin  1.30 mg/dL (0.2-1.0)  H  04/17/18  04:23    


 


AST  17 Units/L (15-37)   04/17/18  04:23    


 


ALT  14 Units/L (12-78)   04/17/18  04:23    


 


Alkaline Phosphatase  125 Units/L ()  H  04/17/18  04:23    


 


Creatine Kinase  74 Units/L ()   04/16/18  14:28    


 


CK-MB (CK-2)  2.8 ng/mL (0-4.0)   04/16/18  14:28    


 


CK/CKMB % Calc  3.8 % (<4)   04/16/18  14:28    


 


Troponin I  0.05 ng/mL (0-1.5)   04/16/18  14:28    


 


B-Natriuretic Peptide  1650 pg/mL (0-79)  H*  04/16/18  14:28    


 


Total Protein  6.1 g/dL (6.4-8.2)  L  04/17/18  04:23    


 


Albumin  2.7 g/dL (3.4-5.0)  L  04/17/18  04:23    


 


Globulin  3.4 g/dL (2.5-4.5)   04/17/18  04:23    


 


Albumin/Globulin Ratio  0.8 Ratio (1.1-2.1)  L  04/17/18  04:23    


 


Vitamin B12  428 pg/mL (193-986)   04/16/18  14:28    


 


Folate  10.0 ng/mL (>8.6)   04/16/18  14:28    


 


Free T4  1.16 ng/dL (0.76-1.46)   04/16/18  14:28    


 


TSH 3rd Generation  23.927 uIU/mL (0.358-3.74)  H  04/16/18  14:28    


 


Specimen Type  Clean catch urine   04/17/18  11:41    


 


Urine Color  Straw  (YELLOW)   04/17/18  11:41    


 


Urine Appearance  Clear  (CLEAR)   04/17/18  11:41    


 


Urine pH  7.0  (5.0 - 8.0)   04/17/18  11:41    


 


Ur Specific Gravity  1.010  (1.000-1.030)   04/17/18  11:41    


 


Urine Protein  Negative  (NEGATIVE)   04/17/18  11:41    


 


Urine Glucose (UA)  Negative  (NEGATIVE)   04/17/18  11:41    


 


Urine Ketones  Negative  (NEGATIVE)   04/17/18  11:41    


 


Urine Occult Blood  Negative  (NEGATIVE)   04/17/18  11:41    


 


Urine Nitrite  Negative  (NEGATIVE)   04/17/18  11:41    


 


Urine Bilirubin  Negative  (NEGATIVE)   04/17/18  11:41    


 


Urine Urobilinogen  Normal  (NORMAL)   04/17/18  11:41    


 


Ur Leukocyte Esterase  1+  (NEGATIVE)   04/17/18  11:41    


 


Urine RBC  None seen /HPF (NONE SEEN)   04/17/18  11:41    


 


Urine WBC  3-5 /HPF (NONE SEEN)   04/17/18  11:41    


 


Ur Squamous Epith Cells  Rare /HPF (NEGATIVE)   04/17/18  11:41    


 


Urine Bacteria  Negative /HPF (NEGATIVE)   04/17/18  11:41    


 


Ur Culture Indicated?  No/not indicated   04/17/18  11:41    














- Plan


(1) Cellulitis of both lower extremities


Status: Acute   


Plan: CXR ON ADMISSION, CBC CMP BNP.  STRICT I & OS, SUPPLEMENTAL O2.  PAIN 

CONTROL, BP MONITORING, WOUND CULTURES, WOUND CARE.  IV ATBX THERAPY, GENTLE 

HYDRATION   





(2) SOB (shortness of breath)


Status: Acute   





(3) Hypokalemia


Status: Acute   





(4) Weakness generalized


Status: Acute   





(5) CHF (congestive heart failure)


Status: Chronic   


Plan: LASIX 20MG IV BID X 2 DOSES.  REPEAT AM CMP, I & OS   





(6) GERD (gastroesophageal reflux disease)


Status: Chronic   





(7) Hypertension


Status: Chronic   





(8) Hypothyroidism


Status: Chronic   





(9) Presence of combination internal cardiac defibrillator (ICD) and pacemaker


Status: Chronic

## 2018-04-18 LAB
ALBUMIN SERPL BCP-MCNC: 2.7 G/DL (ref 3.4–5)
ALP SERPL-CCNC: 146 UNITS/L (ref 46–116)
ALT SERPL W P-5'-P-CCNC: 18 UNITS/L (ref 12–78)
AST SERPL W P-5'-P-CCNC: 22 UNITS/L (ref 15–37)
BASOPHILS # BLD AUTO: 0.1 X10^3/UL (ref 0–0.1)
BASOPHILS NFR BLD AUTO: 0.8 % (ref 0.2–1)
BUN SERPL-MCNC: 19 MG/DL (ref 7–18)
CALCIUM ALBUM COR SERPL-SCNC: (no result) MMOL/L (ref 136–145)
CALCIUM ALBUM COR SERPL-SCNC: 8.9 MG/DL (ref 8.5–10.1)
CALCIUM SERPL-MCNC: 7.9 MG/DL (ref 8.5–10.1)
CHLORIDE SERPL-SCNC: 105 MMOL/L (ref 98–107)
CO2 SERPL-SCNC: 27.3 MMOL/L (ref 21–32)
CREAT SERPL-MCNC: 1.29 MG/DL (ref 0.55–1.02)
EGFR  BLACK RACES: 51 (ref 60–?)
EOSINOPHIL # BLD AUTO: 0.1 X10^3/UL (ref 0–0.2)
EOSINOPHIL NFR BLD AUTO: 1.2 % (ref 0.9–2.9)
ERYTHROCYTE [DISTWIDTH] IN BLOOD BY AUTOMATED COUNT: 18 % (ref 11.6–16.5)
HCT VFR BLD AUTO: 36.7 % (ref 36–47)
HGB BLD-MCNC: 11.8 G/DL (ref 12–16)
LYMPHOCYTES # BLD AUTO: 1.3 X10^3/UL (ref 1.3–2.9)
LYMPHOCYTES NFR BLD AUTO: 19.1 % (ref 21–51)
MAGNESIUM SERPL-MCNC: 1.9 MG/DL (ref 1.7–2.9)
MCH RBC QN AUTO: 28.7 PG (ref 27–34)
MCHC RBC AUTO-ENTMCNC: 32.2 G/DL (ref 33–35)
MCV RBC AUTO: 88.9 FL (ref 80–100)
MONOCYTES # BLD AUTO: 0.6 X10^3/UL (ref 0.3–0.8)
MONOCYTES NFR BLD AUTO: 8.3 % (ref 0–13)
NEUTROPHILS # BLD AUTO: 4.8 X10^3/UL (ref 2.2–4.8)
NEUTROPHILS NFR BLD AUTO: 70.6 % (ref 42–75)
PLATELET # BLD: 207 X10^3/UL (ref 150–450)
PMV BLD AUTO: 9.7 FL (ref 7.4–11)
PROT SERPL-MCNC: 6.1 G/DL (ref 6.4–8.2)
RBC # BLD AUTO: 4.13 X10^6/UL (ref 3.5–5.4)
SODIUM SERPL-SCNC: 140 MMOL/L (ref 136–145)
STOOL FOR WBC: NEGATIVE
WBC NRBC COR # BLD AUTO: 6.8 X10^3/UL (ref 3.6–10)

## 2018-04-18 RX ADMIN — CARVEDILOL SCH MG: 25 TABLET, FILM COATED ORAL at 08:26

## 2018-04-18 RX ADMIN — SODIUM CHLORIDE SCH MLS/HR: 9 INJECTION, SOLUTION INTRAVENOUS at 20:02

## 2018-04-18 RX ADMIN — POTASSIUM CHLORIDE SCH MEQ: 10 CAPSULE, COATED, EXTENDED RELEASE ORAL at 08:26

## 2018-04-18 RX ADMIN — SODIUM CHLORIDE SCH MLS/HR: 9 INJECTION, SOLUTION INTRAVENOUS at 08:27

## 2018-04-18 RX ADMIN — POTASSIUM CHLORIDE AND SODIUM CHLORIDE SCH MLS/HR: 450; 150 INJECTION, SOLUTION INTRAVENOUS at 17:25

## 2018-04-18 RX ADMIN — CARVEDILOL SCH MG: 25 TABLET, FILM COATED ORAL at 20:01

## 2018-04-18 RX ADMIN — CLONAZEPAM SCH MG: 0.5 TABLET ORAL at 20:01

## 2018-04-18 RX ADMIN — FERROUS GLUCONATE SCH MG: 324 TABLET ORAL at 08:26

## 2018-04-18 RX ADMIN — SERTRALINE HYDROCHLORIDE SCH MG: 50 TABLET ORAL at 08:27

## 2018-04-18 NOTE — PCM.PROG
Progress Note





- Progress Note for Day of


Date: 04/18/18





- Subjective


Subjective: GENERALIZED WEAKNESS AND CONTINUED LOWER EXTREMITY EDEMA AND 

REDNESS WITH WEEPING BLISTERS. PT CURRENTLY ON IV ATBX, WOUND CARE





- Past Medical Family Social History


Past Med/Fam/Surg Hx: No changes since H&P


Allergies: 


Allergies





No Known Drug Allergies Allergy (Verified 04/16/18 14:34)


 











- Review of Systems


ROS: No change since H&P





- Vital Signs and I&O's


Vital Signs: 


 





Temperature                      98.1 F


Pulse Rate [Left Brachial]       55


Pulse Rate [Right Brachial]      53


Respiratory Rate                 20


Blood Pressure [Right Arm]       168/79


Blood Pressure [Left Arm]        159/72


Blood Pressure                   135/65


O2 Sat by Pulse Oximetry         96








Intake and Output: 


 Intake & Output











 04/16/18 04/17/18 04/18/18 04/19/18





 11:59 11:59 11:59 11:59


 


Intake Total  985 1360 


 


Output Total  2 300 


 


Balance  983 1060 














- Physical Exam


Oriented: Normal


Eyes: Normal


Ear: Normal


Nose: Normal


Throat: Normal


Respiratory: Diminished


Cardiovascular: Irregular, Edema


: Normal


Auscultation: Bowel Sounds: Normal


Tenderness: Normal


Skin: Vesicular, Red, Tender, Wound (bilateral lower extremities weeping, 

ruptured blisters, bilateral lower extremity redness)


Musculoskeletal: Normal, Leg, Swelling, Tender


Affect: Anxious


Speech Pattern: Clear





- Laboratory and Diagnostics


Result Diagrams: 


 04/18/18 04:30





 04/18/18 04:30


Labs: 


 





04/16/18 15:03   Lip   Gram Stain - Final


04/16/18 15:03   Lip   Wound Culture - Preliminary





 Laboratory











WBC  6.8 X10^3/uL (3.6-10.0)   04/18/18  04:30    


 


RBC  4.13 X10^6/uL (3.5-5.4)   04/18/18  04:30    


 


Hgb  11.8 g/dL (12.0-16.0)  L  04/18/18  04:30    


 


Hct  36.7 % (36.0-47.0)   04/18/18  04:30    


 


MCV  88.9 fL (80.0-100.0)   04/18/18  04:30    


 


MCH  28.7 pg (27.0-34.0)   04/18/18  04:30    


 


MCHC  32.2 g/dL (33.0-35.0)  L  04/18/18  04:30    


 


RDW  18.0 % (11.6-16.5)  H  04/18/18  04:30    


 


Plt Count  207 X10^3/uL (150.0-450.0)   04/18/18  04:30    


 


MPV  9.7 fL (7.4-11.0)   04/18/18  04:30    


 


Neut % (Auto)  70.6 % (42.0-75.0)   04/18/18  04:30    


 


Lymph % (Auto)  19.1 % (21.0-51.0)  L  04/18/18  04:30    


 


Mono % (Auto)  8.3 % (0.0-13.0)   04/18/18  04:30    


 


Eos % (Auto)  1.2 % (0.9-2.9)   04/18/18  04:30    


 


Baso % (Auto)  0.8 % (0.2-1.0)   04/18/18  04:30    


 


Neut # (Auto)  4.8 x10^3/uL (2.2-4.8)   04/18/18  04:30    


 


Lymph # (Auto)  1.3 X10^3/uL (1.3-2.9)   04/18/18  04:30    


 


Mono # (Auto)  0.6 x10^3/uL (0.3-0.8)   04/18/18  04:30    


 


Eos # (Auto)  0.1 x10^3/uL (0.0-0.2)   04/18/18  04:30    


 


Baso # (Auto)  0.1 X10^3/uL (0.0-0.1)   04/18/18  04:30    


 


Absolute Nucleated RBC  0.1 /100WBC  04/18/18  04:30    


 


Sodium  140 mmol/L (136-145)   04/18/18  04:30    


 


Corrected Sodium  TNP   04/18/18  04:30    


 


Potassium  3.9 mmol/L (3.5-5.1)   04/18/18  04:30    


 


Chloride  105 mmol/L ()   04/18/18  04:30    


 


Carbon Dioxide  27.3 mmol/L (21-32)   04/18/18  04:30    


 


BUN  19 mg/dL (7-18)  H  04/18/18  04:30    


 


Creatinine  1.29 mg/dL (0.55-1.02)  H  04/18/18  04:30    


 


Est GFR (MDRD) Af Amer  51  (>60)  L  04/18/18  04:30    


 


Est GFR (MDRD) Non-Af  42  (>60)  L  04/18/18  04:30    


 


Glucose  109 mg/dL (65-99)  H  04/18/18  04:30    


 


Calcium  7.9 mg/dL (8.5-10.1)  L  04/18/18  04:30    


 


Corrected Calcium  8.9 mg/dL (8.5-10.1)   04/18/18  04:30    


 


Magnesium  1.9 mg/dL (1.7-2.9)   04/18/18  04:30    


 


Iron  84 ug/dL ()   04/16/18  14:28    


 


Transferrin  203 mg/dL (202-364)   04/16/18  14:28    


 


Ferritin  149 ng/mL (8-252)   04/16/18  14:28    


 


Total Bilirubin  1.50 mg/dL (0.2-1.0)  H  04/18/18  04:30    


 


AST  22 Units/L (15-37)   04/18/18  04:30    


 


ALT  18 Units/L (12-78)   04/18/18  04:30    


 


Alkaline Phosphatase  146 Units/L ()  H  04/18/18  04:30    


 


Creatine Kinase  74 Units/L ()   04/16/18  14:28    


 


CK-MB (CK-2)  2.8 ng/mL (0-4.0)   04/16/18  14:28    


 


CK/CKMB % Calc  3.8 % (<4)   04/16/18  14:28    


 


Troponin I  0.05 ng/mL (0-1.5)   04/16/18  14:28    


 


B-Natriuretic Peptide  1650 pg/mL (0-79)  H*  04/16/18  14:28    


 


Total Protein  6.1 g/dL (6.4-8.2)  L  04/18/18  04:30    


 


Albumin  2.7 g/dL (3.4-5.0)  L  04/18/18  04:30    


 


Globulin  3.4 g/dL (2.5-4.5)   04/18/18  04:30    


 


Albumin/Globulin Ratio  0.8 Ratio (1.1-2.1)  L  04/18/18  04:30    


 


Vitamin B12  428 pg/mL (193-986)   04/16/18  14:28    


 


Folate  10.0 ng/mL (>8.6)   04/16/18  14:28    


 


Free T4  1.16 ng/dL (0.76-1.46)   04/16/18  14:28    


 


TSH 3rd Generation  23.927 uIU/mL (0.358-3.74)  H  04/16/18  14:28    


 


Specimen Type  Clean catch urine   04/17/18  11:41    


 


Urine Color  Straw  (YELLOW)   04/17/18  11:41    


 


Urine Appearance  Clear  (CLEAR)   04/17/18  11:41    


 


Urine pH  7.0  (5.0 - 8.0)   04/17/18  11:41    


 


Ur Specific Gravity  1.010  (1.000-1.030)   04/17/18  11:41    


 


Urine Protein  Negative  (NEGATIVE)   04/17/18  11:41    


 


Urine Glucose (UA)  Negative  (NEGATIVE)   04/17/18  11:41    


 


Urine Ketones  Negative  (NEGATIVE)   04/17/18  11:41    


 


Urine Occult Blood  Negative  (NEGATIVE)   04/17/18  11:41    


 


Urine Nitrite  Negative  (NEGATIVE)   04/17/18  11:41    


 


Urine Bilirubin  Negative  (NEGATIVE)   04/17/18  11:41    


 


Urine Urobilinogen  Normal  (NORMAL)   04/17/18  11:41    


 


Ur Leukocyte Esterase  1+  (NEGATIVE)   04/17/18  11:41    


 


Urine RBC  None seen /HPF (NONE SEEN)   04/17/18  11:41    


 


Urine WBC  3-5 /HPF (NONE SEEN)   04/17/18  11:41    


 


Ur Squamous Epith Cells  Rare /HPF (NEGATIVE)   04/17/18  11:41    


 


Urine Bacteria  Negative /HPF (NEGATIVE)   04/17/18  11:41    


 


Ur Culture Indicated?  No/not indicated   04/17/18  11:41    














- Plan


(1) Cellulitis of both lower extremities


Status: Acute   


Plan: CXR ON ADMISSION, CBC CMP BNP.  STRICT I & OS, SUPPLEMENTAL O2.  PAIN 

CONTROL, BP MONITORING, WOUND CULTURES, WOUND CARE.  IV ATBX THERAPY, GENTLE 

HYDRATION   





(2) SOB (shortness of breath)


Status: Acute   





(3) Hypokalemia


Status: Acute   





(4) Weakness generalized


Status: Acute   





(5) CHF (congestive heart failure)


Status: Chronic   


Plan: REPEAT AM CMP, I & OS   





(6) GERD (gastroesophageal reflux disease)


Status: Chronic   





(7) Hypertension


Status: Chronic   





(8) Hypothyroidism


Status: Chronic   


Plan: resume home synthroid   





(9) Presence of combination internal cardiac defibrillator (ICD) and pacemaker


Status: Chronic   





(10) Diarrhea


Status: Acute   


Plan: stool studies

## 2018-04-18 NOTE — RAD
HISTORY:  78-year-old female with shortness of breath and history of CHF.



Study: Frontal view of the chest.



Comparison: Chest radiograph 04/16/2018



Findings:



Left chest AICD is unchanged.



The trachea is midline.  The cardiac silhouette is massively enlarged and stable with prominent inter
stitium and perihilar lung markings.  Likely trace left effusion without pneumothorax or consolidatio
n. Soft tissues are unremarkable.  Osseous structures are unremarkable.  



IMPRESSION: 

 

1.  Cardiomegaly with likely trace left effusion, correlate with serology for CHF exacerbation.







Reported By:Electronically Signed by PRISCILLA GEORGE MD at 4/18/2018 9:34:47 AM

## 2018-04-19 LAB
ALBUMIN SERPL BCP-MCNC: 2.8 G/DL (ref 3.4–5)
ALP SERPL-CCNC: 161 UNITS/L (ref 46–116)
ALT SERPL W P-5'-P-CCNC: 19 UNITS/L (ref 12–78)
AST SERPL W P-5'-P-CCNC: 20 UNITS/L (ref 15–37)
BASOPHILS # BLD AUTO: 0.1 X10^3/UL (ref 0–0.1)
BASOPHILS NFR BLD AUTO: 0.8 % (ref 0.2–1)
BUN SERPL-MCNC: 19 MG/DL (ref 7–18)
CALCIUM ALBUM COR SERPL-SCNC: (no result) MMOL/L (ref 136–145)
CALCIUM ALBUM COR SERPL-SCNC: 9.3 MG/DL (ref 8.5–10.1)
CALCIUM SERPL-MCNC: 8.3 MG/DL (ref 8.5–10.1)
CHLORIDE SERPL-SCNC: 105 MMOL/L (ref 98–107)
CO2 SERPL-SCNC: 24.7 MMOL/L (ref 21–32)
CREAT SERPL-MCNC: 1.33 MG/DL (ref 0.55–1.02)
EGFR  BLACK RACES: 50 (ref 60–?)
EOSINOPHIL # BLD AUTO: 0.1 X10^3/UL (ref 0–0.2)
EOSINOPHIL NFR BLD AUTO: 1.3 % (ref 0.9–2.9)
ERYTHROCYTE [DISTWIDTH] IN BLOOD BY AUTOMATED COUNT: 17.4 % (ref 11.6–16.5)
HCT VFR BLD AUTO: 37.1 % (ref 36–47)
HGB BLD-MCNC: 12 G/DL (ref 12–16)
LYMPHOCYTES # BLD AUTO: 1 X10^3/UL (ref 1.3–2.9)
LYMPHOCYTES NFR BLD AUTO: 13.2 % (ref 21–51)
MCH RBC QN AUTO: 28.8 PG (ref 27–34)
MCHC RBC AUTO-ENTMCNC: 32.3 G/DL (ref 33–35)
MCV RBC AUTO: 89.3 FL (ref 80–100)
MONOCYTES # BLD AUTO: 0.7 X10^3/UL (ref 0.3–0.8)
MONOCYTES NFR BLD AUTO: 9.3 % (ref 0–13)
NEUTROPHILS # BLD AUTO: 6 X10^3/UL (ref 2.2–4.8)
NEUTROPHILS NFR BLD AUTO: 75.4 % (ref 42–75)
PLATELET # BLD: 213 X10^3/UL (ref 150–450)
PMV BLD AUTO: 9.4 FL (ref 7.4–11)
PROT SERPL-MCNC: 6.2 G/DL (ref 6.4–8.2)
RBC # BLD AUTO: 4.16 X10^6/UL (ref 3.5–5.4)
SODIUM SERPL-SCNC: 140 MMOL/L (ref 136–145)
WBC NRBC COR # BLD AUTO: 8 X10^3/UL (ref 3.6–10)

## 2018-04-19 RX ADMIN — LOPERAMIDE HYDROCHLORIDE SCH MG: 2 CAPSULE ORAL at 20:47

## 2018-04-19 RX ADMIN — GENTAMICIN SULFATE SCH: 1 OINTMENT TOPICAL at 14:17

## 2018-04-19 RX ADMIN — FUROSEMIDE SCH MG: 10 INJECTION, SOLUTION INTRAMUSCULAR; INTRAVENOUS at 20:47

## 2018-04-19 RX ADMIN — FERROUS GLUCONATE SCH MG: 324 TABLET ORAL at 09:13

## 2018-04-19 RX ADMIN — GENTAMICIN SULFATE SCH APPLIC: 1 OINTMENT TOPICAL at 22:10

## 2018-04-19 RX ADMIN — ASPIRIN SCH MG: 325 TABLET, COATED ORAL at 14:16

## 2018-04-19 RX ADMIN — ALBUTEROL SULFATE PRN EA: 2.5 SOLUTION RESPIRATORY (INHALATION) at 13:00

## 2018-04-19 RX ADMIN — FAMOTIDINE SCH MLS/HR: 20 INJECTION, SOLUTION INTRAVENOUS at 21:00

## 2018-04-19 RX ADMIN — LOPERAMIDE HYDROCHLORIDE SCH MG: 2 CAPSULE ORAL at 14:17

## 2018-04-19 RX ADMIN — LOPERAMIDE HYDROCHLORIDE SCH MG: 2 CAPSULE ORAL at 16:35

## 2018-04-19 RX ADMIN — CLONAZEPAM SCH MG: 0.5 TABLET ORAL at 20:48

## 2018-04-19 RX ADMIN — GENTAMICIN SULFATE SCH APPLIC: 1 OINTMENT TOPICAL at 14:17

## 2018-04-19 RX ADMIN — VANCOMYCIN HYDROCHLORIDE SCH MLS/HR: 1 INJECTION, POWDER, LYOPHILIZED, FOR SOLUTION INTRAVENOUS at 10:07

## 2018-04-19 RX ADMIN — FUROSEMIDE SCH MG: 10 INJECTION, SOLUTION INTRAMUSCULAR; INTRAVENOUS at 10:01

## 2018-04-19 RX ADMIN — POTASSIUM BICARBONATE SCH MEQ: 25 TABLET, EFFERVESCENT ORAL at 10:01

## 2018-04-19 RX ADMIN — FAMOTIDINE SCH MLS/HR: 20 INJECTION, SOLUTION INTRAVENOUS at 14:17

## 2018-04-19 RX ADMIN — POTASSIUM CHLORIDE SCH MEQ: 10 CAPSULE, COATED, EXTENDED RELEASE ORAL at 09:13

## 2018-04-19 RX ADMIN — SERTRALINE HYDROCHLORIDE SCH MG: 50 TABLET ORAL at 09:13

## 2018-04-19 RX ADMIN — CARVEDILOL SCH MG: 25 TABLET, FILM COATED ORAL at 09:13

## 2018-04-19 RX ADMIN — CARVEDILOL SCH MG: 25 TABLET, FILM COATED ORAL at 20:46

## 2018-04-19 RX ADMIN — ALUMINUM HYDROXIDE AND MAGNESIUM HYDROXIDE PRN ML: 200; 200 SUSPENSION ORAL at 00:30

## 2018-04-19 NOTE — PCM.PROG
Progress Note





- Progress Note for Day of


Date: 04/19/18





- Subjective


Subjective: GENERALIZED WEAKNESS AND CONTINUED LOWER EXTREMITY EDEMA AND 

REDNESS WITH WEEPING BLISTERS. PT HAS DIMINISHED LUNG BASES THIS AM AND SOB. PT 

LAST CXR STABLE. WILL CONSULT RESP, LASIX 20MG IV BID X 2 DOSES, CONTINUE 

POTASSIUM REPLACEMENT. WOULD CARE, PT NEEDS PHYSICAL THERAPY. CO SEVERE 

DIARRHEA DURING THE NIGHT, PT D/C TEFLARO, STARTED ON VANCOMYCIN IV





- Past Medical Family Social History


Past Med/Fam/Surg Hx: No changes since H&P


Allergies: 


Allergies





No Known Drug Allergies Allergy (Verified 04/16/18 14:34)


 











- Review of Systems


ROS: No change since H&P





- Vital Signs and I&O's


Vital Signs: 


 





Temperature                      97.5 F


Pulse Rate [Left Brachial]       60


Pulse Rate [Right Brachial]      57


Pulse Rate                       57


Respiratory Rate                 20


Blood Pressure [Right Arm]       190/81


Blood Pressure [Left Arm]        159/72


Blood Pressure                   135/65


O2 Sat by Pulse Oximetry         99








Intake and Output: 


 Intake & Output











 04/17/18 04/18/18 04/19/18 04/20/18





 11:59 11:59 11:59 11:59


 


Intake Total 985 1360 1340 


 


Output Total 2 300  


 


Balance 983 1060 1340 














- Physical Exam


Oriented: Normal


Eyes: Normal


Ear: Normal


Nose: Normal


Throat: Normal


Respiratory: Diminished


Cardiovascular: Irregular, Edema


: Normal


Auscultation: Bowel Sounds: Normal


Tenderness: Normal


Skin: Vesicular, Red, Tender, Wound (bilateral lower extremities weeping, 

ruptured blisters, bilateral lower extremity redness)


Musculoskeletal: Normal, Leg, Swelling, Tender


Affect: Anxious


Speech Pattern: Clear, Appropriate





- Laboratory and Diagnostics


Result Diagrams: 


 04/19/18 04:05





 04/19/18 04:05


Labs: 


 





04/18/18 17:33   Stool   Stool Culture - Preliminary


04/18/18 17:33   Stool    - Final


04/16/18 15:03   Lip   Gram Stain - Final


04/16/18 15:03   Lip   Wound Culture - Final


                            Methicillin Resis Staph Aureus


04/16/18 14:28   Blood   Blood Culture - Preliminary


04/16/18 14:27   Blood   Blood Culture - Preliminary





 Laboratory











WBC  8.0 X10^3/uL (3.6-10.0)   04/19/18  04:05    


 


RBC  4.16 X10^6/uL (3.5-5.4)   04/19/18  04:05    


 


Hgb  12.0 g/dL (12.0-16.0)   04/19/18  04:05    


 


Hct  37.1 % (36.0-47.0)   04/19/18  04:05    


 


MCV  89.3 fL (80.0-100.0)   04/19/18  04:05    


 


MCH  28.8 pg (27.0-34.0)   04/19/18  04:05    


 


MCHC  32.3 g/dL (33.0-35.0)  L  04/19/18  04:05    


 


RDW  17.4 % (11.6-16.5)  H  04/19/18  04:05    


 


Plt Count  213 X10^3/uL (150.0-450.0)   04/19/18  04:05    


 


MPV  9.4 fL (7.4-11.0)   04/19/18  04:05    


 


Neut % (Auto)  75.4 % (42.0-75.0)  H  04/19/18  04:05    


 


Lymph % (Auto)  13.2 % (21.0-51.0)  L  04/19/18  04:05    


 


Mono % (Auto)  9.3 % (0.0-13.0)   04/19/18  04:05    


 


Eos % (Auto)  1.3 % (0.9-2.9)   04/19/18  04:05    


 


Baso % (Auto)  0.8 % (0.2-1.0)   04/19/18  04:05    


 


Neut # (Auto)  6.0 x10^3/uL (2.2-4.8)  H  04/19/18  04:05    


 


Lymph # (Auto)  1.0 X10^3/uL (1.3-2.9)  L  04/19/18  04:05    


 


Mono # (Auto)  0.7 x10^3/uL (0.3-0.8)   04/19/18  04:05    


 


Eos # (Auto)  0.1 x10^3/uL (0.0-0.2)   04/19/18  04:05    


 


Baso # (Auto)  0.1 X10^3/uL (0.0-0.1)   04/19/18  04:05    


 


Absolute Nucleated RBC  0.2 /100WBC  04/19/18  04:05    


 


Sodium  140 mmol/L (136-145)   04/19/18  04:05    


 


Corrected Sodium  TNP   04/19/18  04:05    


 


Potassium  3.8 mmol/L (3.5-5.1)   04/19/18  04:05    


 


Chloride  105 mmol/L ()   04/19/18  04:05    


 


Carbon Dioxide  24.7 mmol/L (21-32)   04/19/18  04:05    


 


BUN  19 mg/dL (7-18)  H  04/19/18  04:05    


 


Creatinine  1.33 mg/dL (0.55-1.02)  H  04/19/18  04:05    


 


Est GFR (MDRD) Af Amer  50  (>60)  L  04/19/18  04:05    


 


Est GFR (MDRD) Non-Af  41  (>60)  L  04/19/18  04:05    


 


Glucose  107 mg/dL (65-99)  H  04/19/18  04:05    


 


Calcium  8.3 mg/dL (8.5-10.1)  L  04/19/18  04:05    


 


Corrected Calcium  9.3 mg/dL (8.5-10.1)   04/19/18  04:05    


 


Magnesium  1.9 mg/dL (1.7-2.9)   04/18/18  04:30    


 


Iron  84 ug/dL ()   04/16/18  14:28    


 


Transferrin  203 mg/dL (202-364)   04/16/18  14:28    


 


Ferritin  149 ng/mL (8-252)   04/16/18  14:28    


 


Total Bilirubin  1.70 mg/dL (0.2-1.0)  H  04/19/18  04:05    


 


AST  20 Units/L (15-37)   04/19/18  04:05    


 


ALT  19 Units/L (12-78)   04/19/18  04:05    


 


Alkaline Phosphatase  161 Units/L ()  H  04/19/18  04:05    


 


Creatine Kinase  74 Units/L ()   04/16/18  14:28    


 


CK-MB (CK-2)  2.8 ng/mL (0-4.0)   04/16/18  14:28    


 


CK/CKMB % Calc  3.8 % (<4)   04/16/18  14:28    


 


Troponin I  0.05 ng/mL (0-1.5)   04/16/18  14:28    


 


B-Natriuretic Peptide  1650 pg/mL (0-79)  H*  04/16/18  14:28    


 


Total Protein  6.2 g/dL (6.4-8.2)  L  04/19/18  04:05    


 


Albumin  2.8 g/dL (3.4-5.0)  L  04/19/18  04:05    


 


Globulin  3.4 g/dL (2.5-4.5)   04/19/18  04:05    


 


Albumin/Globulin Ratio  0.8 Ratio (1.1-2.1)  L  04/19/18  04:05    


 


Vitamin B12  428 pg/mL (193-986)   04/16/18  14:28    


 


Folate  10.0 ng/mL (>8.6)   04/16/18  14:28    


 


Free T4  1.16 ng/dL (0.76-1.46)   04/16/18  14:28    


 


TSH 3rd Generation  23.927 uIU/mL (0.358-3.74)  H  04/16/18  14:28    


 


Specimen Type  Clean catch urine   04/17/18  11:41    


 


Urine Color  Straw  (YELLOW)   04/17/18  11:41    


 


Urine Appearance  Clear  (CLEAR)   04/17/18  11:41    


 


Urine pH  7.0  (5.0 - 8.0)   04/17/18  11:41    


 


Ur Specific Gravity  1.010  (1.000-1.030)   04/17/18  11:41    


 


Urine Protein  Negative  (NEGATIVE)   04/17/18  11:41    


 


Urine Glucose (UA)  Negative  (NEGATIVE)   04/17/18  11:41    


 


Urine Ketones  Negative  (NEGATIVE)   04/17/18  11:41    


 


Urine Occult Blood  Negative  (NEGATIVE)   04/17/18  11:41    


 


Urine Nitrite  Negative  (NEGATIVE)   04/17/18  11:41    


 


Urine Bilirubin  Negative  (NEGATIVE)   04/17/18  11:41    


 


Urine Urobilinogen  Normal  (NORMAL)   04/17/18  11:41    


 


Ur Leukocyte Esterase  1+  (NEGATIVE)   04/17/18  11:41    


 


Urine RBC  None seen /HPF (NONE SEEN)   04/17/18  11:41    


 


Urine WBC  3-5 /HPF (NONE SEEN)   04/17/18  11:41    


 


Ur Squamous Epith Cells  Rare /HPF (NEGATIVE)   04/17/18  11:41    


 


Urine Bacteria  Negative /HPF (NEGATIVE)   04/17/18  11:41    


 


Ur Culture Indicated?  No/not indicated   04/17/18  11:41    


 


Stool Description  30g,loose,unformed   04/18/18  17:33    


 


Stl Occult Blood (IFOB)  Positive  (NEGATIVE)  A  04/18/18  17:33    


 


Stool for White Cells  Negative  (NEGATIVE)   04/18/18  17:33    


 


Stl C. diff Tox B Gene  Negative  (NEGATIVE)   04/18/18  17:33    


 


Stl C. diff [id number]  Negative  (NEGATIVE)   04/18/18  17:33    














- Plan


(1) Cellulitis of both lower extremities


Status: Acute   


Plan: CBC CMP.  STRICT I & OS, SUPPLEMENTAL O2.  PAIN CONTROL, BP MONITORING, 

WOUND CULTURES COLLECTED ON ADMISSION SEE C& S, WOUND CARE.  IV ATBX THERAPY, 

GENTLE HYDRATION   





(2) SOB (shortness of breath)


Status: Acute   


Plan: RESP CONSULT, CXR   





(3) Hypokalemia


Status: Acute   





(4) Weakness generalized


Status: Acute   





(5) CHF (congestive heart failure)


Status: Chronic   


Plan: REPEAT AM CMP, I & OS   





(6) GERD (gastroesophageal reflux disease)


Status: Chronic   





(7) Hypertension


Status: Chronic   





(8) Hypothyroidism


Status: Chronic   


Plan: resume home synthroid   





(9) Presence of combination internal cardiac defibrillator (ICD) and pacemaker


Status: Chronic   





(10) Diarrhea


Status: Acute   


Plan: stool studies NEGATIVE.  IMMODIUM, ORAL HYDRATION, POTASSIUM REPLACEMENT

## 2018-04-19 NOTE — RAD
HISTORY:  Shortness of breath



Study:  Chest AP portable



Comparison: 04/18/2018







Findings:



There is a pacemaker present on the left obscuring the lung markings in the left lung base. The heart
 is enlarged. No congestive heart failure is noted. No acute alveolar infiltrates are identified. The
 bony thorax is unremarkable.



IMPRESSION:

 

Cardiomegaly without congestive heart failure



No infiltrates







Reported By:Electronically Signed by MARY NORMAN MD at 4/19/2018 7:23:02 AM

## 2018-04-20 LAB
ALBUMIN SERPL BCP-MCNC: 2.7 G/DL (ref 3.4–5)
ALP SERPL-CCNC: 150 UNITS/L (ref 46–116)
ALT SERPL W P-5'-P-CCNC: 19 UNITS/L (ref 12–78)
AST SERPL W P-5'-P-CCNC: 18 UNITS/L (ref 15–37)
BASOPHILS # BLD AUTO: 0 X10^3/UL (ref 0–0.1)
BASOPHILS NFR BLD AUTO: 0.4 % (ref 0.2–1)
BUN SERPL-MCNC: 21 MG/DL (ref 7–18)
CALCIUM ALBUM COR SERPL-SCNC: 141 MMOL/L (ref 136–145)
CALCIUM ALBUM COR SERPL-SCNC: 9.6 MG/DL (ref 8.5–10.1)
CALCIUM SERPL-MCNC: 8.6 MG/DL (ref 8.5–10.1)
CHLORIDE SERPL-SCNC: 104 MMOL/L (ref 98–107)
CO2 SERPL-SCNC: 26 MMOL/L (ref 21–32)
CREAT SERPL-MCNC: 1.54 MG/DL (ref 0.55–1.02)
EGFR  BLACK RACES: 42 (ref 60–?)
EOSINOPHIL # BLD AUTO: 0 X10^3/UL (ref 0–0.2)
EOSINOPHIL NFR BLD AUTO: 0 % (ref 0.9–2.9)
ERYTHROCYTE [DISTWIDTH] IN BLOOD BY AUTOMATED COUNT: 17.6 % (ref 11.6–16.5)
HCT VFR BLD AUTO: 38.8 % (ref 36–47)
HGB BLD-MCNC: 12.8 G/DL (ref 12–16)
LYMPHOCYTES # BLD AUTO: 0.5 X10^3/UL (ref 1.3–2.9)
LYMPHOCYTES NFR BLD AUTO: 8.8 % (ref 21–51)
MCH RBC QN AUTO: 29.1 PG (ref 27–34)
MCHC RBC AUTO-ENTMCNC: 32.9 G/DL (ref 33–35)
MCV RBC AUTO: 88.4 FL (ref 80–100)
MONOCYTES # BLD AUTO: 0.1 X10^3/UL (ref 0.3–0.8)
MONOCYTES NFR BLD AUTO: 1 % (ref 0–13)
NEUTROPHILS # BLD AUTO: 5.5 X10^3/UL (ref 2.2–4.8)
NEUTROPHILS NFR BLD AUTO: 89.8 % (ref 42–75)
PLATELET # BLD: 224 X10^3/UL (ref 150–450)
PMV BLD AUTO: 9.7 FL (ref 7.4–11)
PROT SERPL-MCNC: 6.2 G/DL (ref 6.4–8.2)
RBC # BLD AUTO: 4.39 X10^6/UL (ref 3.5–5.4)
SODIUM SERPL-SCNC: 140 MMOL/L (ref 136–145)
WBC NRBC COR # BLD AUTO: 6.1 X10^3/UL (ref 3.6–10)

## 2018-04-20 RX ADMIN — LOPERAMIDE HYDROCHLORIDE SCH: 2 CAPSULE ORAL at 11:17

## 2018-04-20 RX ADMIN — GENTAMICIN SULFATE SCH APPLIC: 1 OINTMENT TOPICAL at 22:11

## 2018-04-20 RX ADMIN — LOPERAMIDE HYDROCHLORIDE SCH: 2 CAPSULE ORAL at 15:02

## 2018-04-20 RX ADMIN — GENTAMICIN SULFATE SCH APPLIC: 1 OINTMENT TOPICAL at 15:01

## 2018-04-20 RX ADMIN — POTASSIUM CHLORIDE SCH MEQ: 10 CAPSULE, COATED, EXTENDED RELEASE ORAL at 09:59

## 2018-04-20 RX ADMIN — CARVEDILOL SCH: 25 TABLET, FILM COATED ORAL at 11:29

## 2018-04-20 RX ADMIN — POTASSIUM BICARBONATE SCH MEQ: 25 TABLET, EFFERVESCENT ORAL at 09:58

## 2018-04-20 RX ADMIN — LOPERAMIDE HYDROCHLORIDE SCH MG: 2 CAPSULE ORAL at 09:59

## 2018-04-20 RX ADMIN — CARVEDILOL SCH MG: 25 TABLET, FILM COATED ORAL at 15:15

## 2018-04-20 RX ADMIN — CARVEDILOL SCH MG: 25 TABLET, FILM COATED ORAL at 22:09

## 2018-04-20 RX ADMIN — VANCOMYCIN HYDROCHLORIDE SCH MLS/HR: 1 INJECTION, POWDER, LYOPHILIZED, FOR SOLUTION INTRAVENOUS at 09:57

## 2018-04-20 RX ADMIN — CARVEDILOL SCH MG: 25 TABLET, FILM COATED ORAL at 09:59

## 2018-04-20 RX ADMIN — POTASSIUM CHLORIDE SCH: 10 CAPSULE, COATED, EXTENDED RELEASE ORAL at 11:30

## 2018-04-20 RX ADMIN — ASPIRIN SCH: 325 TABLET, COATED ORAL at 11:29

## 2018-04-20 RX ADMIN — FAMOTIDINE SCH MLS/HR: 20 INJECTION, SOLUTION INTRAVENOUS at 22:10

## 2018-04-20 RX ADMIN — FUROSEMIDE SCH: 10 INJECTION, SOLUTION INTRAMUSCULAR; INTRAVENOUS at 22:11

## 2018-04-20 RX ADMIN — SERTRALINE HYDROCHLORIDE SCH: 50 TABLET ORAL at 11:17

## 2018-04-20 RX ADMIN — FAMOTIDINE SCH MLS/HR: 20 INJECTION, SOLUTION INTRAVENOUS at 09:58

## 2018-04-20 RX ADMIN — ALBUTEROL SULFATE PRN EA: 2.5 SOLUTION RESPIRATORY (INHALATION) at 09:45

## 2018-04-20 RX ADMIN — FERROUS GLUCONATE SCH: 324 TABLET ORAL at 11:30

## 2018-04-20 RX ADMIN — FUROSEMIDE SCH MG: 10 INJECTION, SOLUTION INTRAMUSCULAR; INTRAVENOUS at 09:58

## 2018-04-20 RX ADMIN — POTASSIUM CHLORIDE AND SODIUM CHLORIDE SCH MLS/HR: 450; 150 INJECTION, SOLUTION INTRAVENOUS at 06:02

## 2018-04-20 RX ADMIN — POTASSIUM CHLORIDE AND SODIUM CHLORIDE SCH: 450; 150 INJECTION, SOLUTION INTRAVENOUS at 22:11

## 2018-04-20 RX ADMIN — FERROUS GLUCONATE SCH MG: 324 TABLET ORAL at 09:59

## 2018-04-20 RX ADMIN — ASPIRIN SCH MG: 325 TABLET, COATED ORAL at 09:59

## 2018-04-20 RX ADMIN — POTASSIUM CHLORIDE AND SODIUM CHLORIDE SCH MLS/HR: 450; 150 INJECTION, SOLUTION INTRAVENOUS at 06:06

## 2018-04-20 RX ADMIN — GENTAMICIN SULFATE SCH APPLIC: 1 OINTMENT TOPICAL at 05:59

## 2018-04-21 LAB
ALBUMIN SERPL BCP-MCNC: 2.9 G/DL (ref 3.4–5)
ALP SERPL-CCNC: 148 UNITS/L (ref 46–116)
ALT SERPL W P-5'-P-CCNC: 19 UNITS/L (ref 12–78)
AST SERPL W P-5'-P-CCNC: 22 UNITS/L (ref 15–37)
BASOPHILS # BLD AUTO: 0 X10^3/UL (ref 0–0.1)
BASOPHILS NFR BLD AUTO: 0.2 % (ref 0.2–1)
BUN SERPL-MCNC: 26 MG/DL (ref 7–18)
CALCIUM ALBUM COR SERPL-SCNC: (no result) MMOL/L (ref 136–145)
CALCIUM ALBUM COR SERPL-SCNC: 9.6 MG/DL (ref 8.5–10.1)
CALCIUM SERPL-MCNC: 8.7 MG/DL (ref 8.5–10.1)
CHLORIDE SERPL-SCNC: 103 MMOL/L (ref 98–107)
CO2 SERPL-SCNC: 25.6 MMOL/L (ref 21–32)
CREAT SERPL-MCNC: 1.65 MG/DL (ref 0.55–1.02)
EGFR  BLACK RACES: 39 (ref 60–?)
EOSINOPHIL # BLD AUTO: 0 X10^3/UL (ref 0–0.2)
EOSINOPHIL NFR BLD AUTO: 0 % (ref 0.9–2.9)
ERYTHROCYTE [DISTWIDTH] IN BLOOD BY AUTOMATED COUNT: 17.9 % (ref 11.6–16.5)
HCT VFR BLD AUTO: 38.6 % (ref 36–47)
HGB BLD-MCNC: 12.7 G/DL (ref 12–16)
LYMPHOCYTES # BLD AUTO: 1.1 X10^3/UL (ref 1.3–2.9)
LYMPHOCYTES NFR BLD AUTO: 8 % (ref 21–51)
MCH RBC QN AUTO: 29.1 PG (ref 27–34)
MCHC RBC AUTO-ENTMCNC: 32.8 G/DL (ref 33–35)
MCV RBC AUTO: 88.7 FL (ref 80–100)
MONOCYTES # BLD AUTO: 0.7 X10^3/UL (ref 0.3–0.8)
MONOCYTES NFR BLD AUTO: 5.1 % (ref 0–13)
NEUTROPHILS # BLD AUTO: 11.6 X10^3/UL (ref 2.2–4.8)
NEUTROPHILS NFR BLD AUTO: 86.7 % (ref 42–75)
PLATELET # BLD: 232 X10^3/UL (ref 150–450)
PMV BLD AUTO: 9.7 FL (ref 7.4–11)
PROT SERPL-MCNC: 6.3 G/DL (ref 6.4–8.2)
RBC # BLD AUTO: 4.36 X10^6/UL (ref 3.5–5.4)
SODIUM SERPL-SCNC: 140 MMOL/L (ref 136–145)
WBC NRBC COR # BLD AUTO: 13.4 X10^3/UL (ref 3.6–10)

## 2018-04-21 RX ADMIN — ASPIRIN SCH MG: 325 TABLET, COATED ORAL at 11:01

## 2018-04-21 RX ADMIN — FUROSEMIDE SCH: 10 INJECTION, SOLUTION INTRAMUSCULAR; INTRAVENOUS at 20:42

## 2018-04-21 RX ADMIN — VANCOMYCIN HYDROCHLORIDE SCH MLS/HR: 1 INJECTION, POWDER, LYOPHILIZED, FOR SOLUTION INTRAVENOUS at 10:47

## 2018-04-21 RX ADMIN — CARVEDILOL SCH MG: 25 TABLET, FILM COATED ORAL at 11:02

## 2018-04-21 RX ADMIN — FAMOTIDINE SCH MLS/HR: 20 INJECTION, SOLUTION INTRAVENOUS at 22:16

## 2018-04-21 RX ADMIN — GENTAMICIN SULFATE SCH APPLIC: 1 OINTMENT TOPICAL at 14:15

## 2018-04-21 RX ADMIN — ASPIRIN SCH: 325 TABLET, COATED ORAL at 12:36

## 2018-04-21 RX ADMIN — ALUMINUM HYDROXIDE AND MAGNESIUM HYDROXIDE PRN ML: 200; 200 SUSPENSION ORAL at 12:49

## 2018-04-21 RX ADMIN — FERROUS GLUCONATE SCH: 324 TABLET ORAL at 12:36

## 2018-04-21 RX ADMIN — POTASSIUM BICARBONATE SCH MEQ: 25 TABLET, EFFERVESCENT ORAL at 11:01

## 2018-04-21 RX ADMIN — GENTAMICIN SULFATE SCH: 1 OINTMENT TOPICAL at 06:43

## 2018-04-21 RX ADMIN — FERROUS GLUCONATE SCH MG: 324 TABLET ORAL at 11:01

## 2018-04-21 RX ADMIN — POTASSIUM CHLORIDE SCH MEQ: 10 CAPSULE, COATED, EXTENDED RELEASE ORAL at 11:01

## 2018-04-21 RX ADMIN — CARVEDILOL SCH: 25 TABLET, FILM COATED ORAL at 20:39

## 2018-04-21 RX ADMIN — POTASSIUM CHLORIDE SCH: 10 CAPSULE, COATED, EXTENDED RELEASE ORAL at 12:37

## 2018-04-21 RX ADMIN — GENTAMICIN SULFATE SCH APPLIC: 1 OINTMENT TOPICAL at 22:16

## 2018-04-21 RX ADMIN — FAMOTIDINE SCH MLS/HR: 20 INJECTION, SOLUTION INTRAVENOUS at 10:47

## 2018-04-21 RX ADMIN — POTASSIUM CHLORIDE AND SODIUM CHLORIDE SCH MLS/HR: 450; 150 INJECTION, SOLUTION INTRAVENOUS at 20:03

## 2018-04-21 RX ADMIN — FUROSEMIDE SCH MG: 10 INJECTION, SOLUTION INTRAMUSCULAR; INTRAVENOUS at 10:47

## 2018-04-21 NOTE — RAD
Chest, AP portable



Indication: CHF



Comparison: 04/19/2018



Findings: Cardiac silhouette enlargement is unchanged. Cardiac device and leads are again noted. Ther
e is decreased depth of inspiration with associated interstitial crowding. There is no convincing bev
ma or dense infiltrate. No large pleural effusion. 



Impression: No significant change from prior, accounting for differences in inspiration.



Reported By:Electronically Signed by STACY WALLIS MD at 4/21/2018 5:34:14 AM

## 2018-04-21 NOTE — CT
CT brain without contrast



Indication: Drowsiness



Comparison: 02/17/2018



Technique:

Multiple axial images of the brain were obtained from the skull base to the vertex without administra
tion of IV contrast.



Findings: 

Moderate generalized cerebral atrophy and bilateral periventricular and deep white matter hypoattenua
tion unchanged from prior examination.



No acute intraparenchymal hemorrhage or mass can be identified.  No extra-axial fluid collections are
 seen.  No alteration in the attenuation of the brain parenchyma can be identified to suggest acute o
r subacute ischemic change.  The ventricular system is symmetric and nondilated.  The extracranial st
ructures are grossly unremarkable.



IMPRESSION:

 

1.  No acute intracranial hemorrhage or significant change from prior examination.



2. Moderate generalized cerebral atrophy and bilateral periventricular and deep white matter hypoatte
nuation is nonspecific; however likely represent sequela of chronic microvascular ischemic disease.







Reported By:Electronically Signed by PARISH BRUSH MD at 4/21/2018 10:22:57 AM

## 2018-04-22 VITALS — SYSTOLIC BLOOD PRESSURE: 172 MMHG | DIASTOLIC BLOOD PRESSURE: 81 MMHG

## 2018-04-22 LAB
ALBUMIN SERPL BCP-MCNC: 2.6 G/DL (ref 3.4–5)
ALP SERPL-CCNC: 161 UNITS/L (ref 46–116)
ALT SERPL W P-5'-P-CCNC: 18 UNITS/L (ref 12–78)
AST SERPL W P-5'-P-CCNC: 19 UNITS/L (ref 15–37)
BASOPHILS # BLD AUTO: 0.1 X10^3/UL (ref 0–0.1)
BASOPHILS NFR BLD AUTO: 0.7 % (ref 0.2–1)
BUN SERPL-MCNC: 27 MG/DL (ref 7–18)
CALCIUM ALBUM COR SERPL-SCNC: (no result) MMOL/L (ref 136–145)
CALCIUM ALBUM COR SERPL-SCNC: 9.4 MG/DL (ref 8.5–10.1)
CALCIUM SERPL-MCNC: 8.3 MG/DL (ref 8.5–10.1)
CHLORIDE SERPL-SCNC: 105 MMOL/L (ref 98–107)
CO2 SERPL-SCNC: 29.8 MMOL/L (ref 21–32)
CREAT SERPL-MCNC: 1.67 MG/DL (ref 0.55–1.02)
CREAT SERPL-MCNC: 1.77 MG/DL (ref 0.55–1.02)
EGFR  BLACK RACES: 38 (ref 60–?)
EOSINOPHIL # BLD AUTO: 0.1 X10^3/UL (ref 0–0.2)
EOSINOPHIL NFR BLD AUTO: 0.8 % (ref 0.9–2.9)
ERYTHROCYTE [DISTWIDTH] IN BLOOD BY AUTOMATED COUNT: 17.3 % (ref 11.6–16.5)
HCT VFR BLD AUTO: 36.5 % (ref 36–47)
HGB BLD-MCNC: 12 G/DL (ref 12–16)
LYMPHOCYTES # BLD AUTO: 1.5 X10^3/UL (ref 1.3–2.9)
LYMPHOCYTES NFR BLD AUTO: 17.4 % (ref 21–51)
MCH RBC QN AUTO: 29.1 PG (ref 27–34)
MCHC RBC AUTO-ENTMCNC: 32.8 G/DL (ref 33–35)
MCV RBC AUTO: 88.6 FL (ref 80–100)
MONOCYTES # BLD AUTO: 0.7 X10^3/UL (ref 0.3–0.8)
MONOCYTES NFR BLD AUTO: 8.1 % (ref 0–13)
NEUTROPHILS # BLD AUTO: 6.5 X10^3/UL (ref 2.2–4.8)
NEUTROPHILS NFR BLD AUTO: 73 % (ref 42–75)
PLATELET # BLD: 219 X10^3/UL (ref 150–450)
PMV BLD AUTO: 9.7 FL (ref 7.4–11)
PROT SERPL-MCNC: 5.6 G/DL (ref 6.4–8.2)
RBC # BLD AUTO: 4.12 X10^6/UL (ref 3.5–5.4)
SODIUM SERPL-SCNC: 141 MMOL/L (ref 136–145)
VANCOMYCIN TROUGH SERPL-MCNC: 19.6 UG/ML (ref 15–20)
WBC NRBC COR # BLD AUTO: 8.9 X10^3/UL (ref 3.6–10)

## 2018-04-22 RX ADMIN — CARVEDILOL SCH MG: 25 TABLET, FILM COATED ORAL at 09:30

## 2018-04-22 RX ADMIN — ASPIRIN SCH MG: 325 TABLET, COATED ORAL at 09:30

## 2018-04-22 RX ADMIN — GENTAMICIN SULFATE SCH APPLIC: 1 OINTMENT TOPICAL at 06:42

## 2018-04-22 RX ADMIN — VANCOMYCIN HYDROCHLORIDE SCH: 1 INJECTION, POWDER, LYOPHILIZED, FOR SOLUTION INTRAVENOUS at 10:29

## 2018-04-22 RX ADMIN — FERROUS GLUCONATE SCH MG: 324 TABLET ORAL at 09:30

## 2018-04-22 RX ADMIN — POTASSIUM CHLORIDE SCH: 10 CAPSULE, COATED, EXTENDED RELEASE ORAL at 10:33

## 2018-04-22 RX ADMIN — FUROSEMIDE SCH MG: 10 INJECTION, SOLUTION INTRAMUSCULAR; INTRAVENOUS at 09:29

## 2018-04-22 RX ADMIN — VANCOMYCIN HYDROCHLORIDE SCH MLS/HR: 1 INJECTION, POWDER, LYOPHILIZED, FOR SOLUTION INTRAVENOUS at 09:39

## 2018-04-22 RX ADMIN — POTASSIUM BICARBONATE SCH MEQ: 25 TABLET, EFFERVESCENT ORAL at 09:30

## 2018-04-22 RX ADMIN — FAMOTIDINE SCH MLS/HR: 20 INJECTION, SOLUTION INTRAVENOUS at 09:30
